# Patient Record
Sex: MALE | Race: WHITE | NOT HISPANIC OR LATINO | URBAN - METROPOLITAN AREA
[De-identification: names, ages, dates, MRNs, and addresses within clinical notes are randomized per-mention and may not be internally consistent; named-entity substitution may affect disease eponyms.]

---

## 2018-01-01 ENCOUNTER — INPATIENT (INPATIENT)
Facility: HOSPITAL | Age: 68
LOS: 2 days | End: 2018-10-07
Attending: INTERNAL MEDICINE | Admitting: INTERNAL MEDICINE
Payer: MEDICARE

## 2018-01-01 VITALS
DIASTOLIC BLOOD PRESSURE: 60 MMHG | HEART RATE: 93 BPM | TEMPERATURE: 104 F | SYSTOLIC BLOOD PRESSURE: 103 MMHG | OXYGEN SATURATION: 100 % | RESPIRATION RATE: 28 BRPM

## 2018-01-01 VITALS — SYSTOLIC BLOOD PRESSURE: 114 MMHG | DIASTOLIC BLOOD PRESSURE: 28 MMHG

## 2018-01-01 LAB
ALBUMIN SERPL ELPH-MCNC: 1.6 G/DL — LOW (ref 3.3–5)
ALBUMIN SERPL ELPH-MCNC: 3 G/DL — LOW (ref 3.3–5)
ALP SERPL-CCNC: 139 U/L — HIGH (ref 40–120)
ALP SERPL-CCNC: 235 U/L — HIGH (ref 40–120)
ALT FLD-CCNC: 1285 U/L — HIGH (ref 12–78)
ALT FLD-CCNC: 16 U/L — SIGNIFICANT CHANGE UP (ref 12–78)
ANION GAP SERPL CALC-SCNC: 10 MMOL/L — SIGNIFICANT CHANGE UP (ref 5–17)
ANION GAP SERPL CALC-SCNC: 10 MMOL/L — SIGNIFICANT CHANGE UP (ref 5–17)
ANION GAP SERPL CALC-SCNC: 20 MMOL/L — HIGH (ref 5–17)
ANION GAP SERPL CALC-SCNC: 26 MMOL/L — HIGH (ref 5–17)
APPEARANCE UR: CLEAR — SIGNIFICANT CHANGE UP
APTT BLD: 30.6 SEC — SIGNIFICANT CHANGE UP (ref 27.5–37.4)
APTT BLD: 61.6 SEC — HIGH (ref 27.5–37.4)
AST SERPL-CCNC: 32 U/L — SIGNIFICANT CHANGE UP (ref 15–37)
AST SERPL-CCNC: SIGNIFICANT CHANGE UP U/L (ref 15–37)
BASE EXCESS BLDA CALC-SCNC: -0.1 MMOL/L — SIGNIFICANT CHANGE UP (ref -2–2)
BASE EXCESS BLDA CALC-SCNC: -20.8 MMOL/L — LOW (ref -2–2)
BASE EXCESS BLDV CALC-SCNC: -21.9 MMOL/L — LOW (ref -2–2)
BASOPHILS # BLD AUTO: 0.03 K/UL — SIGNIFICANT CHANGE UP (ref 0–0.2)
BASOPHILS NFR BLD AUTO: 0.3 % — SIGNIFICANT CHANGE UP (ref 0–2)
BILIRUB DIRECT SERPL-MCNC: 2.57 MG/DL — HIGH (ref 0.05–0.2)
BILIRUB INDIRECT FLD-MCNC: 0.3 MG/DL — SIGNIFICANT CHANGE UP (ref 0.2–1)
BILIRUB SERPL-MCNC: 0.8 MG/DL — SIGNIFICANT CHANGE UP (ref 0.2–1.2)
BILIRUB SERPL-MCNC: 2.9 MG/DL — HIGH (ref 0.2–1.2)
BILIRUB UR-MCNC: NEGATIVE — SIGNIFICANT CHANGE UP
BLOOD GAS COMMENTS, VENOUS: SIGNIFICANT CHANGE UP
BLOOD GAS COMMENTS: SIGNIFICANT CHANGE UP
BLOOD GAS SOURCE: SIGNIFICANT CHANGE UP
BLOOD GAS SOURCE: SIGNIFICANT CHANGE UP
BUN SERPL-MCNC: 29 MG/DL — HIGH (ref 7–23)
BUN SERPL-MCNC: 35 MG/DL — HIGH (ref 7–23)
BUN SERPL-MCNC: 36 MG/DL — HIGH (ref 7–23)
BUN SERPL-MCNC: 36 MG/DL — HIGH (ref 7–23)
C DIFF BY PCR RESULT: SIGNIFICANT CHANGE UP
C DIFF TOX GENS STL QL NAA+PROBE: SIGNIFICANT CHANGE UP
CALCIUM SERPL-MCNC: 6.5 MG/DL — CRITICAL LOW (ref 8.5–10.1)
CALCIUM SERPL-MCNC: 7.6 MG/DL — LOW (ref 8.5–10.1)
CALCIUM SERPL-MCNC: 8 MG/DL — LOW (ref 8.5–10.1)
CALCIUM SERPL-MCNC: 8.1 MG/DL — LOW (ref 8.5–10.1)
CHLORIDE SERPL-SCNC: 100 MMOL/L — SIGNIFICANT CHANGE UP (ref 96–108)
CHLORIDE SERPL-SCNC: 102 MMOL/L — SIGNIFICANT CHANGE UP (ref 96–108)
CHLORIDE SERPL-SCNC: 104 MMOL/L — SIGNIFICANT CHANGE UP (ref 96–108)
CHLORIDE SERPL-SCNC: 105 MMOL/L — SIGNIFICANT CHANGE UP (ref 96–108)
CK MB BLD-MCNC: 1.1 % — SIGNIFICANT CHANGE UP (ref 0–3.5)
CK MB BLD-MCNC: 1.3 % — SIGNIFICANT CHANGE UP (ref 0–3.5)
CK MB BLD-MCNC: 2.2 % — SIGNIFICANT CHANGE UP (ref 0–3.5)
CK MB BLD-MCNC: 2.3 % — SIGNIFICANT CHANGE UP (ref 0–3.5)
CK MB BLD-MCNC: <1.1 % — SIGNIFICANT CHANGE UP (ref 0–3.5)
CK MB CFR SERPL CALC: 1 NG/ML — SIGNIFICANT CHANGE UP (ref 0.5–3.6)
CK MB CFR SERPL CALC: 1.7 NG/ML — SIGNIFICANT CHANGE UP (ref 0.5–3.6)
CK MB CFR SERPL CALC: 1.9 NG/ML — SIGNIFICANT CHANGE UP (ref 0.5–3.6)
CK MB CFR SERPL CALC: 4.2 NG/ML — HIGH (ref 0.5–3.6)
CK MB CFR SERPL CALC: <1 NG/ML — SIGNIFICANT CHANGE UP (ref 0.5–3.6)
CK SERPL-CCNC: 314 U/L — HIGH (ref 26–308)
CK SERPL-CCNC: 74 U/L — SIGNIFICANT CHANGE UP (ref 26–308)
CK SERPL-CCNC: 87 U/L — SIGNIFICANT CHANGE UP (ref 26–308)
CK SERPL-CCNC: 94 U/L — SIGNIFICANT CHANGE UP (ref 26–308)
CK SERPL-CCNC: 95 U/L — SIGNIFICANT CHANGE UP (ref 26–308)
CO2 SERPL-SCNC: 12 MMOL/L — LOW (ref 22–31)
CO2 SERPL-SCNC: 13 MMOL/L — LOW (ref 22–31)
CO2 SERPL-SCNC: 24 MMOL/L — SIGNIFICANT CHANGE UP (ref 22–31)
CO2 SERPL-SCNC: 26 MMOL/L — SIGNIFICANT CHANGE UP (ref 22–31)
COLOR SPEC: YELLOW — SIGNIFICANT CHANGE UP
CREAT SERPL-MCNC: 1.24 MG/DL — SIGNIFICANT CHANGE UP (ref 0.5–1.3)
CREAT SERPL-MCNC: 1.68 MG/DL — HIGH (ref 0.5–1.3)
CREAT SERPL-MCNC: 2.13 MG/DL — HIGH (ref 0.5–1.3)
CREAT SERPL-MCNC: 2.35 MG/DL — HIGH (ref 0.5–1.3)
CULTURE RESULTS: NO GROWTH — SIGNIFICANT CHANGE UP
CULTURE RESULTS: SIGNIFICANT CHANGE UP
DIFF PNL FLD: NEGATIVE — SIGNIFICANT CHANGE UP
DIGOXIN SERPL-MCNC: 0.72 NG/ML — LOW (ref 0.8–2)
EOSINOPHIL # BLD AUTO: 0.16 K/UL — SIGNIFICANT CHANGE UP (ref 0–0.5)
EOSINOPHIL NFR BLD AUTO: 1.7 % — SIGNIFICANT CHANGE UP (ref 0–6)
FLUAV SPEC QL CULT: NEGATIVE — SIGNIFICANT CHANGE UP
FLUBV AG SPEC QL IA: NEGATIVE — SIGNIFICANT CHANGE UP
GAS PNL BLDV: SIGNIFICANT CHANGE UP
GLUCOSE SERPL-MCNC: 106 MG/DL — HIGH (ref 70–99)
GLUCOSE SERPL-MCNC: 128 MG/DL — HIGH (ref 70–99)
GLUCOSE SERPL-MCNC: 36 MG/DL — CRITICAL LOW (ref 70–99)
GLUCOSE SERPL-MCNC: 51 MG/DL — LOW (ref 70–99)
GLUCOSE UR QL: NEGATIVE MG/DL — SIGNIFICANT CHANGE UP
GRAM STN FLD: SIGNIFICANT CHANGE UP
GRAM STN FLD: SIGNIFICANT CHANGE UP
HCO3 BLDA-SCNC: 10 MMOL/L — LOW (ref 21–29)
HCO3 BLDA-SCNC: 22 MMOL/L — SIGNIFICANT CHANGE UP (ref 21–29)
HCO3 BLDV-SCNC: 13 MMOL/L — LOW (ref 21–29)
HCT VFR BLD CALC: 37.7 % — LOW (ref 39–50)
HCT VFR BLD CALC: 40.9 % — SIGNIFICANT CHANGE UP (ref 39–50)
HCT VFR BLD CALC: 42.5 % — SIGNIFICANT CHANGE UP (ref 39–50)
HCT VFR BLD CALC: 44.3 % — SIGNIFICANT CHANGE UP (ref 39–50)
HGB BLD-MCNC: 11.8 G/DL — LOW (ref 13–17)
HGB BLD-MCNC: 12 G/DL — LOW (ref 13–17)
HGB BLD-MCNC: 12.7 G/DL — LOW (ref 13–17)
HGB BLD-MCNC: 12.8 G/DL — LOW (ref 13–17)
HOROWITZ INDEX BLDA+IHG-RTO: 100 — SIGNIFICANT CHANGE UP
HOROWITZ INDEX BLDA+IHG-RTO: 100 — SIGNIFICANT CHANGE UP
HOROWITZ INDEX BLDV+IHG-RTO: 100 — SIGNIFICANT CHANGE UP
IMM GRANULOCYTES NFR BLD AUTO: 0.5 % — SIGNIFICANT CHANGE UP (ref 0–1.5)
INR BLD: 1.3 RATIO — HIGH (ref 0.88–1.16)
INR BLD: 3.61 RATIO — HIGH (ref 0.88–1.16)
KETONES UR-MCNC: NEGATIVE — SIGNIFICANT CHANGE UP
LACTATE SERPL-SCNC: 1.6 MMOL/L — SIGNIFICANT CHANGE UP (ref 0.7–2)
LACTATE SERPL-SCNC: 17 MMOL/L — CRITICAL HIGH (ref 0.7–2)
LEGIONELLA AG UR QL: NEGATIVE — SIGNIFICANT CHANGE UP
LEUKOCYTE ESTERASE UR-ACNC: NEGATIVE — SIGNIFICANT CHANGE UP
LYMPHOCYTES # BLD AUTO: 1.02 K/UL — SIGNIFICANT CHANGE UP (ref 1–3.3)
LYMPHOCYTES # BLD AUTO: 10.8 % — LOW (ref 13–44)
MAGNESIUM SERPL-MCNC: 2 MG/DL — SIGNIFICANT CHANGE UP (ref 1.6–2.6)
MAGNESIUM SERPL-MCNC: 2.6 MG/DL — SIGNIFICANT CHANGE UP (ref 1.6–2.6)
MAGNESIUM SERPL-MCNC: 2.6 MG/DL — SIGNIFICANT CHANGE UP (ref 1.6–2.6)
MCHC RBC-ENTMCNC: 24.8 PG — LOW (ref 27–34)
MCHC RBC-ENTMCNC: 24.9 PG — LOW (ref 27–34)
MCHC RBC-ENTMCNC: 25 PG — LOW (ref 27–34)
MCHC RBC-ENTMCNC: 25.6 PG — LOW (ref 27–34)
MCHC RBC-ENTMCNC: 28.2 GM/DL — LOW (ref 32–36)
MCHC RBC-ENTMCNC: 28.9 GM/DL — LOW (ref 32–36)
MCHC RBC-ENTMCNC: 31.1 GM/DL — LOW (ref 32–36)
MCHC RBC-ENTMCNC: 31.3 GM/DL — LOW (ref 32–36)
MCV RBC AUTO: 79.7 FL — LOW (ref 80–100)
MCV RBC AUTO: 79.9 FL — LOW (ref 80–100)
MCV RBC AUTO: 86 FL — SIGNIFICANT CHANGE UP (ref 80–100)
MCV RBC AUTO: 90.6 FL — SIGNIFICANT CHANGE UP (ref 80–100)
MONOCYTES # BLD AUTO: 0.59 K/UL — SIGNIFICANT CHANGE UP (ref 0–0.9)
MONOCYTES NFR BLD AUTO: 6.3 % — SIGNIFICANT CHANGE UP (ref 2–14)
NEUTROPHILS # BLD AUTO: 7.58 K/UL — HIGH (ref 1.8–7.4)
NEUTROPHILS NFR BLD AUTO: 80.4 % — HIGH (ref 43–77)
NITRITE UR-MCNC: NEGATIVE — SIGNIFICANT CHANGE UP
NRBC # BLD: 0 /100 WBCS — SIGNIFICANT CHANGE UP (ref 0–0)
NT-PROBNP SERPL-SCNC: HIGH PG/ML (ref 0–125)
PCO2 BLDA: 27 MMHG — LOW (ref 32–46)
PCO2 BLDA: 41 MMHG — SIGNIFICANT CHANGE UP (ref 32–46)
PCO2 BLDV: 72 MMHG — HIGH (ref 35–50)
PH BLD: 7.01 — CRITICAL LOW (ref 7.35–7.45)
PH BLD: 7.51 — HIGH (ref 7.35–7.45)
PH BLDV: 6.87 — CRITICAL LOW (ref 7.35–7.45)
PH UR: 5 — SIGNIFICANT CHANGE UP (ref 5–8)
PHOSPHATE SERPL-MCNC: 2.5 MG/DL — SIGNIFICANT CHANGE UP (ref 2.5–4.5)
PHOSPHATE SERPL-MCNC: 7.1 MG/DL — HIGH (ref 2.5–4.5)
PHOSPHATE SERPL-MCNC: >9 MG/DL — SIGNIFICANT CHANGE UP (ref 2.5–4.5)
PLATELET # BLD AUTO: 116 K/UL — LOW (ref 150–400)
PLATELET # BLD AUTO: 200 K/UL — SIGNIFICANT CHANGE UP (ref 150–400)
PLATELET # BLD AUTO: 237 K/UL — SIGNIFICANT CHANGE UP (ref 150–400)
PLATELET # BLD AUTO: 280 K/UL — SIGNIFICANT CHANGE UP (ref 150–400)
PO2 BLDA: 121 MMHG — HIGH (ref 74–108)
PO2 BLDA: 291 MMHG — HIGH (ref 74–108)
PO2 BLDV: <44 MMHG — SIGNIFICANT CHANGE UP (ref 25–45)
POTASSIUM SERPL-MCNC: 3.3 MMOL/L — LOW (ref 3.5–5.3)
POTASSIUM SERPL-MCNC: 4.6 MMOL/L — SIGNIFICANT CHANGE UP (ref 3.5–5.3)
POTASSIUM SERPL-MCNC: 4.7 MMOL/L — SIGNIFICANT CHANGE UP (ref 3.5–5.3)
POTASSIUM SERPL-MCNC: 5.1 MMOL/L — SIGNIFICANT CHANGE UP (ref 3.5–5.3)
POTASSIUM SERPL-SCNC: 3.3 MMOL/L — LOW (ref 3.5–5.3)
POTASSIUM SERPL-SCNC: 4.6 MMOL/L — SIGNIFICANT CHANGE UP (ref 3.5–5.3)
POTASSIUM SERPL-SCNC: 4.7 MMOL/L — SIGNIFICANT CHANGE UP (ref 3.5–5.3)
POTASSIUM SERPL-SCNC: 5.1 MMOL/L — SIGNIFICANT CHANGE UP (ref 3.5–5.3)
PROT SERPL-MCNC: 5.9 GM/DL — LOW (ref 6–8.3)
PROT SERPL-MCNC: 8.7 GM/DL — HIGH (ref 6–8.3)
PROT UR-MCNC: NEGATIVE MG/DL — SIGNIFICANT CHANGE UP
PROTHROM AB SERPL-ACNC: 14.2 SEC — HIGH (ref 9.8–12.7)
PROTHROM AB SERPL-ACNC: 40.4 SEC — HIGH (ref 9.8–12.7)
RBC # BLD: 4.69 M/UL — SIGNIFICANT CHANGE UP (ref 4.2–5.8)
RBC # BLD: 4.72 M/UL — SIGNIFICANT CHANGE UP (ref 4.2–5.8)
RBC # BLD: 5.13 M/UL — SIGNIFICANT CHANGE UP (ref 4.2–5.8)
RBC # BLD: 5.15 M/UL — SIGNIFICANT CHANGE UP (ref 4.2–5.8)
RBC # FLD: 20.9 % — HIGH (ref 10.3–14.5)
RBC # FLD: 21.3 % — HIGH (ref 10.3–14.5)
RBC # FLD: 21.4 % — HIGH (ref 10.3–14.5)
RBC # FLD: 21.6 % — HIGH (ref 10.3–14.5)
SAO2 % BLDA: 100 % — HIGH (ref 92–96)
SAO2 % BLDA: 96 % — SIGNIFICANT CHANGE UP (ref 92–96)
SAO2 % BLDV: 32 % — LOW (ref 67–88)
SODIUM SERPL-SCNC: 135 MMOL/L — SIGNIFICANT CHANGE UP (ref 135–145)
SODIUM SERPL-SCNC: 136 MMOL/L — SIGNIFICANT CHANGE UP (ref 135–145)
SODIUM SERPL-SCNC: 138 MMOL/L — SIGNIFICANT CHANGE UP (ref 135–145)
SODIUM SERPL-SCNC: 143 MMOL/L — SIGNIFICANT CHANGE UP (ref 135–145)
SP GR SPEC: 1 — LOW (ref 1.01–1.02)
SPECIMEN SOURCE: SIGNIFICANT CHANGE UP
TROPONIN I SERPL-MCNC: 0.34 NG/ML — HIGH (ref 0.01–0.04)
TROPONIN I SERPL-MCNC: 0.51 NG/ML — HIGH (ref 0.01–0.04)
TROPONIN I SERPL-MCNC: 0.86 NG/ML — HIGH (ref 0.01–0.04)
TROPONIN I SERPL-MCNC: <.015 NG/ML — SIGNIFICANT CHANGE UP (ref 0.01–0.04)
UROBILINOGEN FLD QL: NEGATIVE MG/DL — SIGNIFICANT CHANGE UP
WBC # BLD: 10.22 K/UL — SIGNIFICANT CHANGE UP (ref 3.8–10.5)
WBC # BLD: 15.41 K/UL — HIGH (ref 3.8–10.5)
WBC # BLD: 15.47 K/UL — HIGH (ref 3.8–10.5)
WBC # BLD: 9.43 K/UL — SIGNIFICANT CHANGE UP (ref 3.8–10.5)
WBC # FLD AUTO: 10.22 K/UL — SIGNIFICANT CHANGE UP (ref 3.8–10.5)
WBC # FLD AUTO: 15.41 K/UL — HIGH (ref 3.8–10.5)
WBC # FLD AUTO: 15.47 K/UL — HIGH (ref 3.8–10.5)
WBC # FLD AUTO: 9.43 K/UL — SIGNIFICANT CHANGE UP (ref 3.8–10.5)

## 2018-01-01 PROCEDURE — 99291 CRITICAL CARE FIRST HOUR: CPT | Mod: 25

## 2018-01-01 PROCEDURE — 99291 CRITICAL CARE FIRST HOUR: CPT

## 2018-01-01 PROCEDURE — 71045 X-RAY EXAM CHEST 1 VIEW: CPT | Mod: 26

## 2018-01-01 PROCEDURE — 93970 EXTREMITY STUDY: CPT | Mod: 26

## 2018-01-01 PROCEDURE — 93306 TTE W/DOPPLER COMPLETE: CPT | Mod: 26

## 2018-01-01 PROCEDURE — 74176 CT ABD & PELVIS W/O CONTRAST: CPT | Mod: 26

## 2018-01-01 PROCEDURE — 31500 INSERT EMERGENCY AIRWAY: CPT

## 2018-01-01 PROCEDURE — 93010 ELECTROCARDIOGRAM REPORT: CPT

## 2018-01-01 RX ORDER — DEXMEDETOMIDINE HYDROCHLORIDE IN 0.9% SODIUM CHLORIDE 4 UG/ML
0.3 INJECTION INTRAVENOUS
Qty: 200 | Refills: 0 | Status: DISCONTINUED | OUTPATIENT
Start: 2018-01-01 | End: 2018-01-01

## 2018-01-01 RX ORDER — BENZOCAINE AND MENTHOL 5; 1 G/100ML; G/100ML
1 LIQUID ORAL
Qty: 0 | Refills: 0 | Status: DISCONTINUED | OUTPATIENT
Start: 2018-01-01 | End: 2018-01-01

## 2018-01-01 RX ORDER — CEFTRIAXONE 500 MG/1
1 INJECTION, POWDER, FOR SOLUTION INTRAMUSCULAR; INTRAVENOUS EVERY 24 HOURS
Qty: 0 | Refills: 0 | Status: DISCONTINUED | OUTPATIENT
Start: 2018-01-01 | End: 2018-01-01

## 2018-01-01 RX ORDER — PHENYLEPHRINE HYDROCHLORIDE 10 MG/ML
0.1 INJECTION INTRAVENOUS
Qty: 160 | Refills: 0 | Status: DISCONTINUED | OUTPATIENT
Start: 2018-01-01 | End: 2018-01-01

## 2018-01-01 RX ORDER — SODIUM BICARBONATE 1 MEQ/ML
100 SYRINGE (ML) INTRAVENOUS ONCE
Qty: 0 | Refills: 0 | Status: COMPLETED | OUTPATIENT
Start: 2018-01-01 | End: 2018-01-01

## 2018-01-01 RX ORDER — DEXTROSE 50 % IN WATER 50 %
100 SYRINGE (ML) INTRAVENOUS ONCE
Qty: 0 | Refills: 0 | Status: COMPLETED | OUTPATIENT
Start: 2018-01-01 | End: 2018-01-01

## 2018-01-01 RX ORDER — POTASSIUM CHLORIDE 20 MEQ
40 PACKET (EA) ORAL ONCE
Qty: 0 | Refills: 0 | Status: COMPLETED | OUTPATIENT
Start: 2018-01-01 | End: 2018-01-01

## 2018-01-01 RX ORDER — SODIUM CHLORIDE 9 MG/ML
1000 INJECTION, SOLUTION INTRAVENOUS ONCE
Qty: 0 | Refills: 0 | Status: COMPLETED | OUTPATIENT
Start: 2018-01-01 | End: 2018-01-01

## 2018-01-01 RX ORDER — EPINEPHRINE 0.3 MG/.3ML
0.1 INJECTION INTRAMUSCULAR; SUBCUTANEOUS ONCE
Qty: 0 | Refills: 0 | Status: COMPLETED | OUTPATIENT
Start: 2018-01-01 | End: 2018-01-01

## 2018-01-01 RX ORDER — DOBUTAMINE HCL 250MG/20ML
5 VIAL (ML) INTRAVENOUS
Qty: 500 | Refills: 0 | Status: DISCONTINUED | OUTPATIENT
Start: 2018-01-01 | End: 2018-01-01

## 2018-01-01 RX ORDER — EPINEPHRINE 0.3 MG/.3ML
0.1 INJECTION INTRAMUSCULAR; SUBCUTANEOUS
Qty: 4 | Refills: 0 | Status: DISCONTINUED | OUTPATIENT
Start: 2018-01-01 | End: 2018-01-01

## 2018-01-01 RX ORDER — DIGOXIN 250 MCG
0.12 TABLET ORAL DAILY
Qty: 0 | Refills: 0 | Status: DISCONTINUED | OUTPATIENT
Start: 2018-01-01 | End: 2018-01-01

## 2018-01-01 RX ORDER — MEXILETINE HYDROCHLORIDE 150 MG/1
150 CAPSULE ORAL EVERY 8 HOURS
Qty: 0 | Refills: 0 | Status: DISCONTINUED | OUTPATIENT
Start: 2018-01-01 | End: 2018-01-01

## 2018-01-01 RX ORDER — AMIODARONE HYDROCHLORIDE 400 MG/1
0.5 TABLET ORAL
Qty: 900 | Refills: 0 | Status: DISCONTINUED | OUTPATIENT
Start: 2018-01-01 | End: 2018-01-01

## 2018-01-01 RX ORDER — EPINEPHRINE 0.3 MG/.3ML
1 INJECTION INTRAMUSCULAR; SUBCUTANEOUS ONCE
Qty: 0 | Refills: 0 | Status: DISCONTINUED | OUTPATIENT
Start: 2018-01-01 | End: 2018-01-01

## 2018-01-01 RX ORDER — NOREPINEPHRINE BITARTRATE/D5W 8 MG/250ML
0.05 PLASTIC BAG, INJECTION (ML) INTRAVENOUS
Qty: 8 | Refills: 0 | Status: DISCONTINUED | OUTPATIENT
Start: 2018-01-01 | End: 2018-01-01

## 2018-01-01 RX ORDER — CHLORHEXIDINE GLUCONATE 213 G/1000ML
15 SOLUTION TOPICAL
Qty: 0 | Refills: 0 | Status: DISCONTINUED | OUTPATIENT
Start: 2018-01-01 | End: 2018-01-01

## 2018-01-01 RX ORDER — DEXTROSE 50 % IN WATER 50 %
50 SYRINGE (ML) INTRAVENOUS ONCE
Qty: 0 | Refills: 0 | Status: COMPLETED | OUTPATIENT
Start: 2018-01-01 | End: 2018-01-01

## 2018-01-01 RX ORDER — FENTANYL CITRATE 50 UG/ML
0.5 INJECTION INTRAVENOUS
Qty: 2500 | Refills: 0 | Status: DISCONTINUED | OUTPATIENT
Start: 2018-01-01 | End: 2018-01-01

## 2018-01-01 RX ORDER — FUROSEMIDE 40 MG
40 TABLET ORAL ONCE
Qty: 0 | Refills: 0 | Status: COMPLETED | OUTPATIENT
Start: 2018-01-01 | End: 2018-01-01

## 2018-01-01 RX ORDER — AMIODARONE HYDROCHLORIDE 400 MG/1
1 TABLET ORAL
Qty: 900 | Refills: 0 | Status: DISCONTINUED | OUTPATIENT
Start: 2018-01-01 | End: 2018-01-01

## 2018-01-01 RX ORDER — ASPIRIN/CALCIUM CARB/MAGNESIUM 324 MG
325 TABLET ORAL DAILY
Qty: 0 | Refills: 0 | Status: DISCONTINUED | OUTPATIENT
Start: 2018-01-01 | End: 2018-01-01

## 2018-01-01 RX ORDER — PIPERACILLIN AND TAZOBACTAM 4; .5 G/20ML; G/20ML
3.38 INJECTION, POWDER, LYOPHILIZED, FOR SOLUTION INTRAVENOUS ONCE
Qty: 0 | Refills: 0 | Status: COMPLETED | OUTPATIENT
Start: 2018-01-01 | End: 2018-01-01

## 2018-01-01 RX ORDER — ACETAMINOPHEN 500 MG
1000 TABLET ORAL ONCE
Qty: 0 | Refills: 0 | Status: COMPLETED | OUTPATIENT
Start: 2018-01-01 | End: 2018-01-01

## 2018-01-01 RX ORDER — SODIUM CHLORIDE 9 MG/ML
1000 INJECTION, SOLUTION INTRAVENOUS ONCE
Qty: 0 | Refills: 0 | Status: DISCONTINUED | OUTPATIENT
Start: 2018-01-01 | End: 2018-01-01

## 2018-01-01 RX ORDER — PIPERACILLIN AND TAZOBACTAM 4; .5 G/20ML; G/20ML
3.38 INJECTION, POWDER, LYOPHILIZED, FOR SOLUTION INTRAVENOUS EVERY 12 HOURS
Qty: 0 | Refills: 0 | Status: DISCONTINUED | OUTPATIENT
Start: 2018-01-01 | End: 2018-01-01

## 2018-01-01 RX ORDER — AMIODARONE HYDROCHLORIDE 400 MG/1
400 TABLET ORAL DAILY
Qty: 0 | Refills: 0 | Status: DISCONTINUED | OUTPATIENT
Start: 2018-01-01 | End: 2018-01-01

## 2018-01-01 RX ORDER — ACETAMINOPHEN 500 MG
650 TABLET ORAL EVERY 6 HOURS
Qty: 0 | Refills: 0 | Status: DISCONTINUED | OUTPATIENT
Start: 2018-01-01 | End: 2018-01-01

## 2018-01-01 RX ORDER — AZITHROMYCIN 500 MG/1
500 TABLET, FILM COATED ORAL EVERY 24 HOURS
Qty: 0 | Refills: 0 | Status: DISCONTINUED | OUTPATIENT
Start: 2018-01-01 | End: 2018-01-01

## 2018-01-01 RX ORDER — SODIUM CHLORIDE 9 MG/ML
1000 INJECTION, SOLUTION INTRAVENOUS
Qty: 0 | Refills: 0 | Status: DISCONTINUED | OUTPATIENT
Start: 2018-01-01 | End: 2018-01-01

## 2018-01-01 RX ORDER — TAMSULOSIN HYDROCHLORIDE 0.4 MG/1
0.4 CAPSULE ORAL AT BEDTIME
Qty: 0 | Refills: 0 | Status: DISCONTINUED | OUTPATIENT
Start: 2018-01-01 | End: 2018-01-01

## 2018-01-01 RX ORDER — PIPERACILLIN AND TAZOBACTAM 4; .5 G/20ML; G/20ML
3.38 INJECTION, POWDER, LYOPHILIZED, FOR SOLUTION INTRAVENOUS EVERY 8 HOURS
Qty: 0 | Refills: 0 | Status: DISCONTINUED | OUTPATIENT
Start: 2018-01-01 | End: 2018-01-01

## 2018-01-01 RX ORDER — VASOPRESSIN 20 [USP'U]/ML
0.04 INJECTION INTRAVENOUS
Qty: 100 | Refills: 0 | Status: DISCONTINUED | OUTPATIENT
Start: 2018-01-01 | End: 2018-01-01

## 2018-01-01 RX ORDER — AMIODARONE HYDROCHLORIDE 400 MG/1
150 TABLET ORAL ONCE
Qty: 0 | Refills: 0 | Status: COMPLETED | OUTPATIENT
Start: 2018-01-01 | End: 2018-01-01

## 2018-01-01 RX ORDER — CHLORHEXIDINE GLUCONATE 213 G/1000ML
1 SOLUTION TOPICAL
Qty: 0 | Refills: 0 | Status: DISCONTINUED | OUTPATIENT
Start: 2018-01-01 | End: 2018-01-01

## 2018-01-01 RX ORDER — HALOPERIDOL DECANOATE 100 MG/ML
2 INJECTION INTRAMUSCULAR ONCE
Qty: 0 | Refills: 0 | Status: COMPLETED | OUTPATIENT
Start: 2018-01-01 | End: 2018-01-01

## 2018-01-01 RX ORDER — EPINEPHRINE 0.3 MG/.3ML
1 INJECTION INTRAMUSCULAR; SUBCUTANEOUS ONCE
Qty: 0 | Refills: 0 | Status: COMPLETED | OUTPATIENT
Start: 2018-01-01 | End: 2018-01-01

## 2018-01-01 RX ORDER — NOREPINEPHRINE BITARTRATE/D5W 8 MG/250ML
0.05 PLASTIC BAG, INJECTION (ML) INTRAVENOUS
Qty: 16 | Refills: 0 | Status: DISCONTINUED | OUTPATIENT
Start: 2018-01-01 | End: 2018-01-01

## 2018-01-01 RX ORDER — SODIUM BICARBONATE 1 MEQ/ML
50 SYRINGE (ML) INTRAVENOUS ONCE
Qty: 0 | Refills: 0 | Status: COMPLETED | OUTPATIENT
Start: 2018-01-01 | End: 2018-01-01

## 2018-01-01 RX ORDER — LIDOCAINE HCL 20 MG/ML
10 VIAL (ML) INJECTION ONCE
Qty: 0 | Refills: 0 | Status: COMPLETED | OUTPATIENT
Start: 2018-01-01 | End: 2018-01-01

## 2018-01-01 RX ORDER — FUROSEMIDE 40 MG
20 TABLET ORAL ONCE
Qty: 0 | Refills: 0 | Status: COMPLETED | OUTPATIENT
Start: 2018-01-01 | End: 2018-01-01

## 2018-01-01 RX ORDER — SODIUM CHLORIDE 9 MG/ML
2300 INJECTION INTRAMUSCULAR; INTRAVENOUS; SUBCUTANEOUS ONCE
Qty: 0 | Refills: 0 | Status: COMPLETED | OUTPATIENT
Start: 2018-01-01 | End: 2018-01-01

## 2018-01-01 RX ORDER — HYDROCORTISONE 20 MG
80 TABLET ORAL EVERY 8 HOURS
Qty: 0 | Refills: 0 | Status: DISCONTINUED | OUTPATIENT
Start: 2018-01-01 | End: 2018-01-01

## 2018-01-01 RX ORDER — ALPRAZOLAM 0.25 MG
0.25 TABLET ORAL ONCE
Qty: 0 | Refills: 0 | Status: DISCONTINUED | OUTPATIENT
Start: 2018-01-01 | End: 2018-01-01

## 2018-01-01 RX ORDER — MORPHINE SULFATE 50 MG/1
2 CAPSULE, EXTENDED RELEASE ORAL ONCE
Qty: 0 | Refills: 0 | Status: DISCONTINUED | OUTPATIENT
Start: 2018-01-01 | End: 2018-01-01

## 2018-01-01 RX ORDER — FUROSEMIDE 40 MG
40 TABLET ORAL ONCE
Qty: 0 | Refills: 0 | Status: DISCONTINUED | OUTPATIENT
Start: 2018-01-01 | End: 2018-01-01

## 2018-01-01 RX ORDER — LIDOCAINE HCL 20 MG/ML
100 VIAL (ML) INJECTION ONCE
Qty: 0 | Refills: 0 | Status: COMPLETED | OUTPATIENT
Start: 2018-01-01 | End: 2018-01-01

## 2018-01-01 RX ORDER — FUROSEMIDE 40 MG
40 TABLET ORAL EVERY 12 HOURS
Qty: 0 | Refills: 0 | Status: DISCONTINUED | OUTPATIENT
Start: 2018-01-01 | End: 2018-01-01

## 2018-01-01 RX ORDER — PANTOPRAZOLE SODIUM 20 MG/1
40 TABLET, DELAYED RELEASE ORAL DAILY
Qty: 0 | Refills: 0 | Status: DISCONTINUED | OUTPATIENT
Start: 2018-01-01 | End: 2018-01-01

## 2018-01-01 RX ORDER — ACETAMINOPHEN 500 MG
975 TABLET ORAL ONCE
Qty: 0 | Refills: 0 | Status: COMPLETED | OUTPATIENT
Start: 2018-01-01 | End: 2018-01-01

## 2018-01-01 RX ORDER — SODIUM CHLORIDE 9 MG/ML
1000 INJECTION INTRAMUSCULAR; INTRAVENOUS; SUBCUTANEOUS ONCE
Qty: 0 | Refills: 0 | Status: COMPLETED | OUTPATIENT
Start: 2018-01-01 | End: 2018-01-01

## 2018-01-01 RX ORDER — HEPARIN SODIUM 5000 [USP'U]/ML
5000 INJECTION INTRAVENOUS; SUBCUTANEOUS EVERY 8 HOURS
Qty: 0 | Refills: 0 | Status: DISCONTINUED | OUTPATIENT
Start: 2018-01-01 | End: 2018-01-01

## 2018-01-01 RX ORDER — VANCOMYCIN HCL 1 G
1000 VIAL (EA) INTRAVENOUS ONCE
Qty: 0 | Refills: 0 | Status: COMPLETED | OUTPATIENT
Start: 2018-01-01 | End: 2018-01-01

## 2018-01-01 RX ORDER — MIDODRINE HYDROCHLORIDE 2.5 MG/1
10 TABLET ORAL THREE TIMES A DAY
Qty: 0 | Refills: 0 | Status: DISCONTINUED | OUTPATIENT
Start: 2018-01-01 | End: 2018-01-01

## 2018-01-01 RX ORDER — SIMVASTATIN 20 MG/1
20 TABLET, FILM COATED ORAL AT BEDTIME
Qty: 0 | Refills: 0 | Status: DISCONTINUED | OUTPATIENT
Start: 2018-01-01 | End: 2018-01-01

## 2018-01-01 RX ORDER — PROPOFOL 10 MG/ML
30 INJECTION, EMULSION INTRAVENOUS
Qty: 1000 | Refills: 0 | Status: DISCONTINUED | OUTPATIENT
Start: 2018-01-01 | End: 2018-01-01

## 2018-01-01 RX ADMIN — EPINEPHRINE 28.12 MICROGRAM(S)/KG/MIN: 0.3 INJECTION INTRAMUSCULAR; SUBCUTANEOUS at 08:46

## 2018-01-01 RX ADMIN — HEPARIN SODIUM 5000 UNIT(S): 5000 INJECTION INTRAVENOUS; SUBCUTANEOUS at 06:45

## 2018-01-01 RX ADMIN — AMIODARONE HYDROCHLORIDE 400 MILLIGRAM(S): 400 TABLET ORAL at 06:45

## 2018-01-01 RX ADMIN — Medication 1 MILLIGRAM(S): at 14:50

## 2018-01-01 RX ADMIN — CHLORHEXIDINE GLUCONATE 15 MILLILITER(S): 213 SOLUTION TOPICAL at 17:22

## 2018-01-01 RX ADMIN — Medication 7.03 MICROGRAM(S)/KG/MIN: at 18:20

## 2018-01-01 RX ADMIN — HEPARIN SODIUM 5000 UNIT(S): 5000 INJECTION INTRAVENOUS; SUBCUTANEOUS at 16:13

## 2018-01-01 RX ADMIN — HEPARIN SODIUM 5000 UNIT(S): 5000 INJECTION INTRAVENOUS; SUBCUTANEOUS at 14:27

## 2018-01-01 RX ADMIN — Medication 10 MILLILITER(S): at 14:48

## 2018-01-01 RX ADMIN — Medication 1000 MILLIGRAM(S): at 16:23

## 2018-01-01 RX ADMIN — AZITHROMYCIN 255 MILLIGRAM(S): 500 TABLET, FILM COATED ORAL at 06:46

## 2018-01-01 RX ADMIN — Medication 100 MILLIEQUIVALENT(S): at 08:30

## 2018-01-01 RX ADMIN — Medication 7.03 MICROGRAM(S)/KG/MIN: at 12:02

## 2018-01-01 RX ADMIN — Medication 975 MILLIGRAM(S): at 06:47

## 2018-01-01 RX ADMIN — Medication 650 MILLIGRAM(S): at 21:17

## 2018-01-01 RX ADMIN — Medication 3.52 MICROGRAM(S)/KG/MIN: at 17:45

## 2018-01-01 RX ADMIN — SIMVASTATIN 20 MILLIGRAM(S): 20 TABLET, FILM COATED ORAL at 21:41

## 2018-01-01 RX ADMIN — AMIODARONE HYDROCHLORIDE 16.67 MG/MIN: 400 TABLET ORAL at 21:42

## 2018-01-01 RX ADMIN — MIDODRINE HYDROCHLORIDE 10 MILLIGRAM(S): 2.5 TABLET ORAL at 05:08

## 2018-01-01 RX ADMIN — AZITHROMYCIN 255 MILLIGRAM(S): 500 TABLET, FILM COATED ORAL at 07:06

## 2018-01-01 RX ADMIN — MORPHINE SULFATE 2 MILLIGRAM(S): 50 CAPSULE, EXTENDED RELEASE ORAL at 12:30

## 2018-01-01 RX ADMIN — Medication 3.52 MICROGRAM(S)/KG/MIN: at 01:30

## 2018-01-01 RX ADMIN — VASOPRESSIN 2.4 UNIT(S)/MIN: 20 INJECTION INTRAVENOUS at 17:46

## 2018-01-01 RX ADMIN — CHLORHEXIDINE GLUCONATE 15 MILLILITER(S): 213 SOLUTION TOPICAL at 18:41

## 2018-01-01 RX ADMIN — Medication 100 MILLIEQUIVALENT(S): at 22:25

## 2018-01-01 RX ADMIN — Medication 100 MILLIGRAM(S): at 15:10

## 2018-01-01 RX ADMIN — EPINEPHRINE 28.12 MICROGRAM(S)/KG/MIN: 0.3 INJECTION INTRAMUSCULAR; SUBCUTANEOUS at 01:29

## 2018-01-01 RX ADMIN — HALOPERIDOL DECANOATE 2 MILLIGRAM(S): 100 INJECTION INTRAMUSCULAR at 14:39

## 2018-01-01 RX ADMIN — PHENYLEPHRINE HYDROCHLORIDE 1.41 MICROGRAM(S)/KG/MIN: 10 INJECTION INTRAVENOUS at 17:45

## 2018-01-01 RX ADMIN — AZITHROMYCIN 255 MILLIGRAM(S): 500 TABLET, FILM COATED ORAL at 05:29

## 2018-01-01 RX ADMIN — AMIODARONE HYDROCHLORIDE 150 MILLIGRAM(S): 400 TABLET ORAL at 15:00

## 2018-01-01 RX ADMIN — Medication 100 MILLILITER(S): at 10:07

## 2018-01-01 RX ADMIN — CHLORHEXIDINE GLUCONATE 1 APPLICATION(S): 213 SOLUTION TOPICAL at 06:07

## 2018-01-01 RX ADMIN — SODIUM CHLORIDE 2300 MILLILITER(S): 9 INJECTION INTRAMUSCULAR; INTRAVENOUS; SUBCUTANEOUS at 05:32

## 2018-01-01 RX ADMIN — Medication 325 MILLIGRAM(S): at 11:58

## 2018-01-01 RX ADMIN — HEPARIN SODIUM 5000 UNIT(S): 5000 INJECTION INTRAVENOUS; SUBCUTANEOUS at 07:05

## 2018-01-01 RX ADMIN — CHLORHEXIDINE GLUCONATE 15 MILLILITER(S): 213 SOLUTION TOPICAL at 07:06

## 2018-01-01 RX ADMIN — Medication 1000 MILLIGRAM(S): at 05:32

## 2018-01-01 RX ADMIN — Medication 7.03 MICROGRAM(S)/KG/MIN: at 07:50

## 2018-01-01 RX ADMIN — Medication 40 MILLIGRAM(S): at 06:46

## 2018-01-01 RX ADMIN — MIDODRINE HYDROCHLORIDE 10 MILLIGRAM(S): 2.5 TABLET ORAL at 16:12

## 2018-01-01 RX ADMIN — Medication 100 MILLIEQUIVALENT(S): at 06:10

## 2018-01-01 RX ADMIN — CHLORHEXIDINE GLUCONATE 15 MILLILITER(S): 213 SOLUTION TOPICAL at 06:07

## 2018-01-01 RX ADMIN — PIPERACILLIN AND TAZOBACTAM 200 GRAM(S): 4; .5 INJECTION, POWDER, LYOPHILIZED, FOR SOLUTION INTRAVENOUS at 04:33

## 2018-01-01 RX ADMIN — PIPERACILLIN AND TAZOBACTAM 25 GRAM(S): 4; .5 INJECTION, POWDER, LYOPHILIZED, FOR SOLUTION INTRAVENOUS at 06:46

## 2018-01-01 RX ADMIN — EPINEPHRINE 0.1 MILLIGRAM(S): 0.3 INJECTION INTRAMUSCULAR; SUBCUTANEOUS at 05:10

## 2018-01-01 RX ADMIN — SODIUM CHLORIDE 2000 MILLILITER(S): 9 INJECTION, SOLUTION INTRAVENOUS at 06:17

## 2018-01-01 RX ADMIN — Medication 250 MILLIGRAM(S): at 04:32

## 2018-01-01 RX ADMIN — Medication 40 MILLIGRAM(S): at 05:19

## 2018-01-01 RX ADMIN — SIMVASTATIN 20 MILLIGRAM(S): 20 TABLET, FILM COATED ORAL at 21:17

## 2018-01-01 RX ADMIN — Medication 40 MILLIEQUIVALENT(S): at 06:24

## 2018-01-01 RX ADMIN — Medication 650 MILLIGRAM(S): at 14:03

## 2018-01-01 RX ADMIN — VASOPRESSIN 2.4 UNIT(S)/MIN: 20 INJECTION INTRAVENOUS at 07:34

## 2018-01-01 RX ADMIN — CEFTRIAXONE 100 GRAM(S): 500 INJECTION, POWDER, FOR SOLUTION INTRAMUSCULAR; INTRAVENOUS at 12:02

## 2018-01-01 RX ADMIN — Medication 10 MILLIGRAM(S): at 14:45

## 2018-01-01 RX ADMIN — EPINEPHRINE 1 MILLIGRAM(S): 0.3 INJECTION INTRAMUSCULAR; SUBCUTANEOUS at 22:25

## 2018-01-01 RX ADMIN — Medication 100 MILLIEQUIVALENT(S): at 20:01

## 2018-01-01 RX ADMIN — MIDODRINE HYDROCHLORIDE 10 MILLIGRAM(S): 2.5 TABLET ORAL at 14:27

## 2018-01-01 RX ADMIN — PANTOPRAZOLE SODIUM 40 MILLIGRAM(S): 20 TABLET, DELAYED RELEASE ORAL at 11:58

## 2018-01-01 RX ADMIN — PIPERACILLIN AND TAZOBACTAM 25 GRAM(S): 4; .5 INJECTION, POWDER, LYOPHILIZED, FOR SOLUTION INTRAVENOUS at 11:58

## 2018-01-01 RX ADMIN — MIDODRINE HYDROCHLORIDE 10 MILLIGRAM(S): 2.5 TABLET ORAL at 21:18

## 2018-01-01 RX ADMIN — Medication 80 MILLIGRAM(S): at 16:12

## 2018-01-01 RX ADMIN — HEPARIN SODIUM 5000 UNIT(S): 5000 INJECTION INTRAVENOUS; SUBCUTANEOUS at 21:17

## 2018-01-01 RX ADMIN — Medication 400 MILLIGRAM(S): at 16:08

## 2018-01-01 RX ADMIN — Medication 325 MILLIGRAM(S): at 12:02

## 2018-01-01 RX ADMIN — MORPHINE SULFATE 2 MILLIGRAM(S): 50 CAPSULE, EXTENDED RELEASE ORAL at 12:21

## 2018-01-01 RX ADMIN — Medication 2 MILLIGRAM(S): at 04:33

## 2018-01-01 RX ADMIN — EPINEPHRINE 1 MILLIGRAM(S): 0.3 INJECTION INTRAMUSCULAR; SUBCUTANEOUS at 07:07

## 2018-01-01 RX ADMIN — Medication 80 MILLIGRAM(S): at 21:40

## 2018-01-01 RX ADMIN — PANTOPRAZOLE SODIUM 40 MILLIGRAM(S): 20 TABLET, DELAYED RELEASE ORAL at 17:21

## 2018-01-01 RX ADMIN — Medication 7.03 MICROGRAM(S)/KG/MIN: at 07:34

## 2018-01-01 RX ADMIN — Medication 650 MILLIGRAM(S): at 14:30

## 2018-01-01 RX ADMIN — HEPARIN SODIUM 5000 UNIT(S): 5000 INJECTION INTRAVENOUS; SUBCUTANEOUS at 06:07

## 2018-01-01 RX ADMIN — HEPARIN SODIUM 5000 UNIT(S): 5000 INJECTION INTRAVENOUS; SUBCUTANEOUS at 05:08

## 2018-01-01 RX ADMIN — SODIUM CHLORIDE 2000 MILLILITER(S): 9 INJECTION INTRAMUSCULAR; INTRAVENOUS; SUBCUTANEOUS at 04:43

## 2018-01-01 RX ADMIN — MIDODRINE HYDROCHLORIDE 10 MILLIGRAM(S): 2.5 TABLET ORAL at 21:41

## 2018-01-01 RX ADMIN — EPINEPHRINE 28.12 MICROGRAM(S)/KG/MIN: 0.3 INJECTION INTRAMUSCULAR; SUBCUTANEOUS at 17:47

## 2018-01-01 RX ADMIN — Medication 50 MILLIEQUIVALENT(S): at 05:10

## 2018-01-01 RX ADMIN — SIMVASTATIN 20 MILLIGRAM(S): 20 TABLET, FILM COATED ORAL at 21:09

## 2018-01-01 RX ADMIN — TAMSULOSIN HYDROCHLORIDE 0.4 MILLIGRAM(S): 0.4 CAPSULE ORAL at 21:09

## 2018-01-01 RX ADMIN — HEPARIN SODIUM 5000 UNIT(S): 5000 INJECTION INTRAVENOUS; SUBCUTANEOUS at 21:41

## 2018-01-01 RX ADMIN — AMIODARONE HYDROCHLORIDE 33.33 MG/MIN: 400 TABLET ORAL at 15:15

## 2018-01-01 RX ADMIN — HEPARIN SODIUM 5000 UNIT(S): 5000 INJECTION INTRAVENOUS; SUBCUTANEOUS at 13:38

## 2018-01-01 RX ADMIN — Medication 40 MILLIGRAM(S): at 18:21

## 2018-01-01 RX ADMIN — EPINEPHRINE 1 MILLIGRAM(S): 0.3 INJECTION INTRAMUSCULAR; SUBCUTANEOUS at 06:15

## 2018-01-01 RX ADMIN — Medication 975 MILLIGRAM(S): at 05:30

## 2018-01-01 RX ADMIN — MEXILETINE HYDROCHLORIDE 150 MILLIGRAM(S): 150 CAPSULE ORAL at 14:27

## 2018-01-01 RX ADMIN — Medication 325 MILLIGRAM(S): at 11:22

## 2018-01-01 RX ADMIN — Medication 0.12 MILLIGRAM(S): at 06:45

## 2018-01-01 RX ADMIN — Medication 50 MILLILITER(S): at 08:34

## 2018-01-01 RX ADMIN — Medication 650 MILLIGRAM(S): at 06:21

## 2018-01-01 RX ADMIN — EPINEPHRINE 28.13 MICROGRAM(S)/KG/MIN: 0.3 INJECTION INTRAMUSCULAR; SUBCUTANEOUS at 11:45

## 2018-01-01 RX ADMIN — SODIUM CHLORIDE 2300 MILLILITER(S): 9 INJECTION INTRAMUSCULAR; INTRAVENOUS; SUBCUTANEOUS at 04:33

## 2018-01-01 RX ADMIN — MIDODRINE HYDROCHLORIDE 10 MILLIGRAM(S): 2.5 TABLET ORAL at 06:07

## 2018-01-01 RX ADMIN — PROPOFOL 13.5 MICROGRAM(S)/KG/MIN: 10 INJECTION, EMULSION INTRAVENOUS at 16:40

## 2018-01-01 RX ADMIN — Medication 0.12 MILLIGRAM(S): at 05:08

## 2018-01-01 RX ADMIN — Medication 40 MILLIGRAM(S): at 02:39

## 2018-01-01 RX ADMIN — AMIODARONE HYDROCHLORIDE 400 MILLIGRAM(S): 400 TABLET ORAL at 05:08

## 2018-01-01 RX ADMIN — PHENYLEPHRINE HYDROCHLORIDE 1.41 MICROGRAM(S)/KG/MIN: 10 INJECTION INTRAVENOUS at 01:30

## 2018-01-01 RX ADMIN — PIPERACILLIN AND TAZOBACTAM 3.38 GRAM(S): 4; .5 INJECTION, POWDER, LYOPHILIZED, FOR SOLUTION INTRAVENOUS at 05:00

## 2018-01-01 RX ADMIN — MIDODRINE HYDROCHLORIDE 10 MILLIGRAM(S): 2.5 TABLET ORAL at 20:31

## 2018-01-01 RX ADMIN — Medication 80 MILLIGRAM(S): at 06:07

## 2018-01-01 RX ADMIN — Medication 0.25 MILLIGRAM(S): at 05:07

## 2018-01-01 RX ADMIN — Medication 11.25 MICROGRAM(S)/KG/MIN: at 17:45

## 2018-01-01 RX ADMIN — HEPARIN SODIUM 5000 UNIT(S): 5000 INJECTION INTRAVENOUS; SUBCUTANEOUS at 21:09

## 2018-01-01 RX ADMIN — Medication 3.52 MICROGRAM(S)/KG/MIN: at 08:46

## 2018-01-01 RX ADMIN — CEFTRIAXONE 100 GRAM(S): 500 INJECTION, POWDER, FOR SOLUTION INTRAMUSCULAR; INTRAVENOUS at 11:22

## 2018-01-01 RX ADMIN — Medication 650 MILLIGRAM(S): at 21:45

## 2018-01-01 RX ADMIN — SODIUM CHLORIDE 150 MILLILITER(S): 9 INJECTION, SOLUTION INTRAVENOUS at 17:45

## 2018-01-01 RX ADMIN — Medication 50 MILLILITER(S): at 06:01

## 2018-01-01 RX ADMIN — PIPERACILLIN AND TAZOBACTAM 25 GRAM(S): 4; .5 INJECTION, POWDER, LYOPHILIZED, FOR SOLUTION INTRAVENOUS at 00:36

## 2018-10-04 NOTE — H&P ADULT - ATTENDING COMMENTS
I have seen and examined the patient. I agree with the above history, physical exam, and plan of care except for as detailed below.    69 y/o M w/hx of HFrEF s/p AICD placement admitted for acute hypoxemic respiratory failure. CXR showing bilateral infiltrates consistent with acute pulmonary edema. BNP > 15968. Now leukocytosis, however patient does have documented fever. Patient also w/DAVID vs CKD. Prior Cr in August 1.40. US of lower extremities negative for DVT.    - Continue BiPAP, wean as tolerated  - Diuresis goal net negative ~ 1L  - Empiric antibiotics for fever  - Avoid nephrotoxins, trend Cr I have seen and examined the patient. I agree with the above history, physical exam, and plan of care except for as detailed below.    67 y/o M w/hx of HFrEF s/p AICD placement admitted for acute hypoxemic respiratory failure. CXR showing bilateral infiltrates consistent with acute pulmonary edema. BNP > 23960. Now leukocytosis, however patient does have documented fever. Patient also w/DAVID vs CKD. Prior Cr in August 1.40. US of lower extremities negative for DVT.    - Continue BiPAP, wean as tolerated  - Diuresis goal net negative ~ 1L  - Empiric antibiotics for fever  - Avoid nephrotoxins, trend Cr    Attending critical care time 35 minutes I have seen and examined the patient. I agree with the above history, physical exam, and plan of care except for as detailed below.    69 y/o M w/hx of HFrEF s/p AICD placement admitted for acute hypoxemic respiratory failure likely secondary to pulmonary edema in the setting of heart failure exacerbation. CXR showing bilateral infiltrates consistent with acute pulmonary edema. BNP > 06338. Now leukocytosis, however patient does have documented fever. Patient also w/DAVID vs CKD. Prior Cr in August 1.40. US of lower extremities negative for DVT.    - Continue BiPAP, wean as tolerated  - Diuresis goal net negative ~ 1L  - Empiric antibiotics for fever  - Avoid nephrotoxins, trend Cr    Attending critical care time 35 minutes I have seen and examined the patient. I agree with the above history, physical exam, and plan of care except for as detailed below.    67 y/o M w/hx of HFrEF s/p AICD placement admitted for acute hypoxemic respiratory failure likely secondary to pulmonary edema in the setting of heart failure exacerbation. CXR showing bilateral infiltrates consistent with acute pulmonary edema. BNP > 58429. Now leukocytosis, however patient does have documented fever. Patient also w/DAVID vs CKD. Prior Cr in August 1.40. US of lower extremities negative for DVT.    - Continue BiPAP, wean as tolerated  - Diuresis goal net negative ~ 1L  - Empiric antibiotics for fever  - Avoid nephrotoxins, trend Cr  - Outpatient follow up for L inguinal hernia    Attending critical care time 35 minutes

## 2018-10-04 NOTE — ED ADULT NURSE NOTE - OBJECTIVE STATEMENT
67 y/o M PMH chf, copd, cabg BIBA in respiratory distress, on cpap, placed on bipap b/l leg swelling, tachypneic, former smoker. c/o difficulty breathing x3 days

## 2018-10-04 NOTE — ED PROVIDER NOTE - MEDICAL DECISION MAKING DETAILS
Patient brought for respiratory distress. Sepsis alert was called in ER.  Labs, cultures, imaging ordered and reviewed.  IVF and antibiotics were initiated.  Based on studies, source seems to be  respiratory.  Pending CT abdomen to r/o further source.  No meningeal symptoms.  Patient is to be admitted to the hospital and the case was discussed with the admitting physician.  Any changes in plan, additional imaging/labs, and further work up will be at the discretion of the admitting physician. Patient brought for respiratory distress. Sepsis alert was called in ER.  Labs, cultures, imaging ordered and reviewed.  IVF and antibiotics were initiated.  Based on studies, source seems to be  respiratory.  Pending CT abdomen to r/o further source, admitting team to follow up.  No meningeal symptoms.  Patient is to be admitted to the hospital and the case was discussed with the admitting physician.  Any changes in plan, additional imaging/labs, and further work up will be at the discretion of the admitting physician.

## 2018-10-04 NOTE — H&P ADULT - NSHPPHYSICALEXAM_GEN_ALL_CORE
PHYSICAL EXAM:  GENERAL:in distress on bipap machine  CHEST/LUNG: b/l rales  HEART: irregularly irregular. No murmurs, rubs, or gallops.   ABDOMEN: Soft, Nontender, Nondistended. Bowel sounds present  hernia to right femoral   EXTREMITIES:  b/l leg edema, warm to touch, engorged veins  MS: awake, knows self

## 2018-10-04 NOTE — ED ADULT NURSE NOTE - NSIMPLEMENTINTERV_GEN_ALL_ED
Implemented All Fall with Harm Risk Interventions:  Pillow to call system. Call bell, personal items and telephone within reach. Instruct patient to call for assistance. Room bathroom lighting operational. Non-slip footwear when patient is off stretcher. Physically safe environment: no spills, clutter or unnecessary equipment. Stretcher in lowest position, wheels locked, appropriate side rails in place. Provide visual cue, wrist band, yellow gown, etc. Monitor gait and stability. Monitor for mental status changes and reorient to person, place, and time. Review medications for side effects contributing to fall risk. Reinforce activity limits and safety measures with patient and family. Provide visual clues: red socks.

## 2018-10-04 NOTE — ED PROVIDER NOTE - CARE PLAN
Principal Discharge DX:	Sepsis, due to unspecified organism  Secondary Diagnosis:	CHF (congestive heart failure)

## 2018-10-04 NOTE — ED PROVIDER NOTE - PHYSICAL EXAMINATION
Gen: Alert, moderate distress, ill appearing  Head: NC, AT, PERRL, EOMI, normal lids/conjunctiva  ENT: normal hearing, patent oropharynx without erythema/exudate, uvula midline  Neck: +supple, no tenderness/meningismus, +JVD, +Trachea midline  Pulm: Bilateral BS, increased resp effort, +BL rales throughout  CV: RRR, no M/R/G, +dist pulses  Abd: soft, +large left inguinal hernia which is reducible, +BS  Mskel: +BL pitting edema, no erythema/cyanosis  Skin: no rash, warm/dry  Neuro:no apparent sensory/motor deficits, coordination intact Gen: Alert, moderate distress, ill appearing  Head: NC, AT, PERRL, EOMI, normal lids/conjunctiva  ENT: normal hearing, patent oropharynx without erythema/exudate, uvula midline  Neck: +supple, no tenderness/meningismus, +JVD, +Trachea midline  Pulm: Bilateral BS, increased resp effort, +BL rales throughout  CV: RRR, no M/R/G, +dist pulses  Abd: soft, NT, +large left inguinal hernia which is reducible, +BS  Mskel: +BL pitting edema, no erythema/cyanosis  Skin: no rash, warm/dry  Neuro:no apparent sensory/motor deficits, coordination intact

## 2018-10-04 NOTE — ED PROVIDER NOTE - CRITICAL CARE PROVIDED
direct patient care (not related to procedure)/interpretation of diagnostic studies/consultation with other physicians/documentation

## 2018-10-04 NOTE — H&P ADULT - ASSESSMENT
Patient is a 68 year old male with pmhx of , CHF w/ AICD, Emphysema, former smoker(quit 10yrs ago), BPH , admits to critical care for respiratory distress on bipap machine with possible pneumonia fever of 103, vs CHF exacerbation  -admit to critical care  -resp: on bipap machine 16/5/50%, wean as tolerates  repeat abg, if no improvement possible intubation  sent rapid flu A/B, ?sputum culture, urine legionella  - cardio: in CHF, received 40 mg of lasix, resume home dose lasix 40mg bid  and strict I/O  ECHO, EKG, trend troponins  -ID: pancultured, received zosyn, vanco, and add azithro while waiting for sensitivity  -GI npo for now  -DVT prophylaxis: heparin SC

## 2018-10-04 NOTE — H&P ADULT - PMH
AICD (automatic cardioverter/defibrillator) present    CHF (congestive heart failure)    Coronary artery disease    Femoral-femoral bypass graft thrombosis, right    HTN (hypertension)    MI (myocardial infarction)

## 2018-10-04 NOTE — ED PROVIDER NOTE - OBJECTIVE STATEMENT
Pertinent PMH/PSH/FHx/SHx and Review of Systems contained within:  Patient presents to the ED for shortness of breath.  Patient brought by EMS for difficulty breathing was saturating 72% in the field, started on CPAP.  On arrival has difficulty speaking.  Denies any chest pain.  Patient denies EtOH/tobacco/illicit substance use.

## 2018-10-04 NOTE — H&P ADULT - HISTORY OF PRESENT ILLNESS
Patient is a 68 year old male with pmhx of , CHF w/ AICD, Emphysema, former smoker(quit 10yrs ago), BPH ,  c/o difficulty breathing x3 days Patient is a 68 year old male with pmhx of , CHF w/ AICD, Emphysema, former smoker(quit 10yrs ago), BPH ,  c/o difficulty breathing x3 days, found by EMS in distress with low saturation without oxygen, and placed on bipap machine and transferred to ED.  While in ED, pt is being worked up for sepsis, received 2L of normal saline boluses, and also received 40mg of lasix of CHF exacerbation.  Pt very uncomfortable looking, unable to provide history, while on bipap machine.    Patient transferred to critical care for further care.

## 2018-10-04 NOTE — H&P ADULT - NSHPLABSRESULTS_GEN_ALL_CORE
.  LABS:                         12.7   9.43  )-----------( 237      ( 04 Oct 2018 02:18 )             40.9     10-04    136  |  100  |  36<H>  ----------------------------<  106<H>  4.7   |  26  |  1.68<H>    Ca    8.0<L>      04 Oct 2018 02:18    TPro  8.7<H>  /  Alb  3.0<L>  /  TBili  0.8  /  DBili  x   /  AST  32  /  ALT  16  /  AlkPhos  139<H>  10-04    PT/INR - ( 04 Oct 2018 02:18 )   PT: 14.2 sec;   INR: 1.30 ratio         PTT - ( 04 Oct 2018 02:18 )  PTT:30.6 sec  Urinalysis Basic - ( 04 Oct 2018 05:21 )    Color: Yellow / Appearance: Clear / S.005 / pH: x  Gluc: x / Ketone: Negative  / Bili: Negative / Urobili: Negative mg/dL   Blood: x / Protein: Negative mg/dL / Nitrite: Negative   Leuk Esterase: Negative / RBC: x / WBC x   Sq Epi: x / Non Sq Epi: x / Bacteria: x      Serum Pro-Brain Natriuretic Peptide: 52451 pg/mL (10-04 @ 02:18)    Lactate, Blood: 1.6 mmol/L (10-04 @ 02:18)      RADIOLOGY, EKG & ADDITIONAL TESTS: Reviewed.

## 2018-10-05 NOTE — CHART NOTE - NSCHARTNOTEFT_GEN_A_CORE
Patient noted to be in ventricular tachycardia, patient initially mainted normal BP despite VTach to 150s/160s. Patient was given amio 150 mg bolus and lidocaine bolus. Patient briefly went ot HR of ~ 200 with drop in BP, however VTach soon broke and BP returned to baseline. After the event patient became altered and was intubated for airway protection and acute hypoxemic respiratory failure. Amio gtt was initiated.

## 2018-10-05 NOTE — PROGRESS NOTE ADULT - SUBJECTIVE AND OBJECTIVE BOX
HPI:  Patient is a 68 year old male with pmhx of , CHF w/ AICD, Emphysema, former smoker(quit 10yrs ago), BPH ,  c/o difficulty breathing x3 days, found by EMS in distress with low saturation without oxygen, and placed on bipap machine and transferred to ED.  While in ED, pt is being worked up for sepsis, received 2L of normal saline boluses, and also received 40mg of lasix of CHF exacerbation.  Pt very uncomfortable looking, unable to provide history, while on bipap machine.    Patient transferred to critical care for further care. (04 Oct 2018 05:29)      24 hr events: Went into VTach receieved amio and lidocaine, became altered, intubated for respiratory failure. Unable to obtain history.    ## ROS:  [x ] unable to obtain    ## Vitals  ICU Vital Signs Last 24 Hrs  T(C): 37.5 (05 Oct 2018 16:07), Max: 39.6 (05 Oct 2018 06:35)  T(F): 99.5 (05 Oct 2018 16:07), Max: 103.2 (05 Oct 2018 06:35)  HR: 117 (05 Oct 2018 16:30) (60 - 144)  BP: 94/66 (05 Oct 2018 16:30) (73/47 - 128/65)  BP(mean): 72 (05 Oct 2018 16:30) (45 - 101)  ABP: --  ABP(mean): --  RR: 29 (05 Oct 2018 16:30) (17 - 52)  SpO2: 89% (05 Oct 2018 16:30) (80% - 100%)      ## Physical Exam:  Gen: Elderly male lying in bed, sedated, intubated, NAD  HEENT: NC/AT sclerae anicteric  Resp: Mechanical breath sounds b/l  CV: Tachycardic, regular rhythm  Abd: Soft + BS, large L inguinal hernia, reducible  Neuro: Sedated, unarousable    ## Vent Data  Mode: AC/ CMV (Assist Control/ Continuous Mandatory Ventilation)  RR (machine): 12  TV (machine): 460  FiO2: 50  PEEP: 5  ITime: 1  MAP: 16  PIP: 28      ## Labs:  Chem:  10-05    138  |  104  |  29<H>  ----------------------------<  51<L>  3.3<L>   |  24  |  1.24    Ca    7.6<L>      05 Oct 2018 04:15  Phos  2.5     10-05  Mg     2.0     10-05    TPro  8.7<H>  /  Alb  3.0<L>  /  TBili  0.8  /  DBili  x   /  AST  32  /  ALT  16  /  AlkPhos  139<H>  10-04    LIVER FUNCTIONS - ( 04 Oct 2018 02:18 )  Alb: 3.0 g/dL / Pro: 8.7 gm/dL / ALK PHOS: 139 U/L / ALT: 16 U/L / AST: 32 U/L / GGT: x           CBC:                        11.8   10.22 )-----------( 200      ( 05 Oct 2018 04:15 )             37.7     Coags:  PT/INR - ( 04 Oct 2018 02:18 )   PT: 14.2 sec;   INR: 1.30 ratio         PTT - ( 04 Oct 2018 02:18 )  PTT:30.6 sec    Urinalysis Basic - ( 04 Oct 2018 05:21 )    Color: Yellow / Appearance: Clear / S.005 / pH: x  Gluc: x / Ketone: Negative  / Bili: Negative / Urobili: Negative mg/dL   Blood: x / Protein: Negative mg/dL / Nitrite: Negative   Leuk Esterase: Negative / RBC: x / WBC x   Sq Epi: x / Non Sq Epi: x / Bacteria: x        ## Cardiac  CARDIAC MARKERS ( 04 Oct 2018 22:22 )  <.015 ng/mL / x     / x     / x     / x      CARDIAC MARKERS ( 04 Oct 2018 11:55 )  <.015 ng/mL / x     / 95 U/L / x     / <1.0 ng/mL  CARDIAC MARKERS ( 04 Oct 2018 02:18 )  <.015 ng/mL / x     / 94 U/L / x     / 1.0 ng/mL        ## Blood Gas  ABG - ( 05 Oct 2018 16:52 )  pH, Arterial: x     pH, Blood: 7.36  /  pCO2: 36    /  pO2: 64    / HCO3: 20    / Base Excess: -4.7  /  SaO2: 90                  #I/Os  I&O's Detail    04 Oct 2018 07:01  -  05 Oct 2018 07:00  --------------------------------------------------------  IN:    IV PiggyBack: 300 mL    Oral Fluid: 360 mL  Total IN: 660 mL    OUT:    Indwelling Catheter - Urethral: 1960 mL  Total OUT: 1960 mL    Total NET: -1300 mL      05 Oct 2018 07:01  -  05 Oct 2018 17:03  --------------------------------------------------------  IN:    norepinephrine Infusion: 140.5 mL    Oral Fluid: 230 mL  Total IN: 370.5 mL    OUT:    Indwelling Catheter - Urethral: 140 mL  Total OUT: 140 mL    Total NET: 230.5 mL          ## Imaging:    ## Medications:  MEDICATIONS  (STANDING):  amiodarone Infusion 1 mG/Min (33.333 mL/Hr) IV Continuous <Continuous>  amiodarone Infusion 0.5 mG/Min (16.667 mL/Hr) IV Continuous <Continuous>  aspirin 325 milliGRAM(s) Oral daily  azithromycin  IVPB 500 milliGRAM(s) IV Intermittent every 24 hours  cefTRIAXone   IVPB 1 Gram(s) IV Intermittent every 24 hours  chlorhexidine 0.12% Liquid 15 milliLiter(s) Oral Mucosa two times a day  digoxin     Tablet 0.125 milliGRAM(s) Oral daily  furosemide   Injectable 40 milliGRAM(s) IV Push every 12 hours  heparin  Injectable 5000 Unit(s) SubCutaneous every 8 hours  midodrine 10 milliGRAM(s) Oral three times a day  norepinephrine Infusion 0.05 MICROgram(s)/kG/Min (7.031 mL/Hr) IV Continuous <Continuous>  pantoprazole  Injectable 40 milliGRAM(s) IV Push daily  propofol Infusion 30 MICROgram(s)/kG/Min (13.5 mL/Hr) IV Continuous <Continuous>  simvastatin 20 milliGRAM(s) Oral at bedtime    MEDICATIONS  (PRN):  acetaminophen   Tablet .. 650 milliGRAM(s) Oral every 6 hours PRN Mild Pain (1 - 3)

## 2018-10-06 NOTE — DIETITIAN INITIAL EVALUATION ADULT. - PERTINENT LABORATORY DATA
10-06 Na143 mmol/L Glu 128 mg/dL<H> K+ 4.6 mmol/L Cr  2.35 mg/dL<H> BUN 36 mg/dL<H> 10-06 Phos >9.0 mg/dL 10-06 Alb 1.6 g/dL<L>10-06 ALT 1285 U/L<H> AST >79804 U/L Alkaline Phosphatase 235 U/L<H> 10-06 Hgb 12.0 g/dL<L> Hct 42.5 %

## 2018-10-06 NOTE — PROVIDER CONTACT NOTE (MEDICATION) - BACKGROUND
67 y/o M w/hx of HFrEF s/p AICD placement admitted for acute hypoxemic respiratory failure likely secondary to pulmonary edema in the setting of heart failure exacerbation, in cardiogenic shock

## 2018-10-06 NOTE — CHART NOTE - NSCHARTNOTEFT_GEN_A_CORE
HPI:  Patient is a 68 year old male with pmhx of , CHF w/ AICD, Emphysema, former smoker(quit 10yrs ago), BPH ,  c/o difficulty breathing x3 days, found by EMS in distress with low saturation without oxygen, and placed on bipap machine and transferred to ED.  While in ED, pt is being worked up for sepsis, received 2L of normal saline boluses, and also received 40mg of lasix of CHF exacerbation.  Pt very uncomfortable looking, unable to provide history, while on bipap machine.    Patient transferred to critical care for further care. (04 Oct 2018 05:29) Patient now in cardiogenic shock with shock liver on three pressors including dobutamine.  Patient lower extremities cyanotic,     Patient with pacemaker, ICD. Point of Care Ultrasound performed; cardiac wall motion noted. Patient unresponsive  Vital Signs Last 24 Hrs  T(C): 36.8 (06 Oct 2018 19:30), Max: 37.4 (06 Oct 2018 00:00)  T(F): 98.2 (06 Oct 2018 19:30), Max: 99.4 (06 Oct 2018 00:00)  HR: 64 (06 Oct 2018 21:00) (53 - 102)  BP: 52/31 (06 Oct 2018 21:00) (33/11 - 126/93)  BP(mean): 34 (06 Oct 2018 21:00) (4 - 100)  RR: 20 (06 Oct 2018 21:00) (16 - 29)  SpO2: 96% (06 Oct 2018 21:00) (88% - 100%)      10-05-18 @ 07:01  -  10-06-18 @ 07:00  --------------------------------------------------------  IN: 6032.6 mL / OUT: 555 mL / NET: 5477.6 mL    10-06-18 @ 07:01  -  10-06-18 @ 22:02  --------------------------------------------------------  IN: 9526.3 mL / OUT: 80 mL / NET: 9446.3 mL    Lab Results:  CBC  CBC Full  -  ( 06 Oct 2018 11:47 )  WBC Count : 15.41 K/uL  Hemoglobin : 12.0 g/dL  Hematocrit : 42.5 %  Platelet Count - Automated : 116 K/uL  Mean Cell Volume : 90.6 fl  Mean Cell Hemoglobin : 25.6 pg  Mean Cell Hemoglobin Concentration : 28.2 gm/dL  Auto Neutrophil # : x  Auto Lymphocyte # : x  Auto Monocyte # : x  Auto Eosinophil # : x  Auto Basophil # : x  Auto Neutrophil % : x  Auto Lymphocyte % : x  Auto Monocyte % : x  Auto Eosinophil % : x  Auto Basophil % : x    .		Differential:	[] Automated		[] Manual  Chemistry                        12.0   15.41 )-----------( 116      ( 06 Oct 2018 11:47 )             42.5     10-06    143  |  105  |  36<H>  ----------------------------<  128<H>  4.6   |  12<L>  |  2.35<H>    Ca    6.5<LL>      06 Oct 2018 11:47  Phos  >9.0     10-06  Mg     2.6     10-06    TPro  5.9<L>  /  Alb  1.6<L>  /  TBili  2.9<H>  /  DBili  2.57<H>  /  AST  >93232  /  ALT  1285<H>  /  AlkPhos  235<H>  10-06    LIVER FUNCTIONS - ( 06 Oct 2018 11:47 )  Alb: 1.6 g/dL / Pro: 5.9 gm/dL / ALK PHOS: 235 U/L / ALT: 1285 U/L / AST: >90756 U/L / GGT: x           PT/INR - ( 06 Oct 2018 11:47 )   PT: 40.4 sec;   INR: 3.61 ratio         PTT - ( 06 Oct 2018 11:47 )  PTT:61.6 sec      ABG - ( 06 Oct 2018 09:44 )  pH, Arterial: x     pH, Blood: 7.01  /  pCO2: 41    /  pO2: 121   / HCO3: 10    / Base Excess: -20.8 /  SaO2: 96          MICROBIOLOGY/CULTURES:  Culture Results:   No growth to date. (10-04 @ 10:19)  Culture Results:   No growth to date. (10-04 @ 10:19)  Culture Results:   No growth (10-04 @ 09:37)    A/P Septic/Cardiogenic Shock- poor prognosis                                               Brother informed

## 2018-10-06 NOTE — PROGRESS NOTE ADULT - SUBJECTIVE AND OBJECTIVE BOX
CC: Patient is a 68y old  Male who presents with a chief complaint of resp failure (05 Oct 2018 17:03)      ## HPI:HPI:  Patient is a 68 year old male with pmhx of , CHF w/ AICD, Emphysema, former smoker(quit 10yrs ago), BPH ,  c/o difficulty breathing x3 days, found by EMS in distress with low saturation without oxygen, and placed on bipap machine and transferred to ED.  While in ED, pt is being worked up for sepsis, received 2L of normal saline boluses, and also received 40mg of lasix of CHF exacerbation.  Pt very uncomfortable looking, unable to provide history, while on bipap machine.    Patient transferred to critical care for further care. (04 Oct 2018 05:29)      O/N: central line placed emergently for increasing pressor requirements despite 3 pressors, patient severly hypotensive with severe shock liver and AST in [address]71's    ## ROS:  unable to assess ros due to mental status   ## EXAM  ICU Vital Signs Last 24 Hrs  T(C): 36.4 (06 Oct 2018 15:30), Max: 38 (05 Oct 2018 19:27)  T(F): 97.5 (06 Oct 2018 15:30), Max: 100.4 (05 Oct 2018 19:27)  HR: 63 (06 Oct 2018 15:00) (59 - 125)  BP: 46/31 (06 Oct 2018 15:00) (36/16 - 126/93)  BP(mean): 35 (06 Oct 2018 15:00) (4 - 100)  ABP: --  ABP(mean): --  RR: 20 (06 Oct 2018 15:00) (16 - 39)  SpO2: 100% (06 Oct 2018 15:00) (88% - 100%)    CON : NAD  EENT : EOMI, MMM  NECK : Full ROM  RESP : CTAB no increased WOB  CARD : rrr no m/r/g  ABD : S NT ND NABS no rebound  EXT : cool extremities  NEURO : AAOX0  ## Labs:  Lab Results:  CBC  CBC Full  -  ( 06 Oct 2018 11:47 )  WBC Count : 15.41 K/uL  Hemoglobin : 12.0 g/dL  Hematocrit : 42.5 %  Platelet Count - Automated : 116 K/uL  Mean Cell Volume : 90.6 fl  Mean Cell Hemoglobin : 25.6 pg  Mean Cell Hemoglobin Concentration : 28.2 gm/dL  Auto Neutrophil # : x  Auto Lymphocyte # : x  Auto Monocyte # : x  Auto Eosinophil # : x  Auto Basophil # : x  Auto Neutrophil % : x  Auto Lymphocyte % : x  Auto Monocyte % : x  Auto Eosinophil % : x  Auto Basophil % : x    .		Differential:	[] Automated		[] Manual  Chemistry                        12.0   15.41 )-----------( 116      ( 06 Oct 2018 11:47 )             42.5     10-06    143  |  105  |  36<H>  ----------------------------<  128<H>  4.6   |  12<L>  |  2.35<H>    Ca    6.5<LL>      06 Oct 2018 11:47  Phos  >9.0     10-06  Mg     2.6     10-06    TPro  5.9<L>  /  Alb  1.6<L>  /  TBili  2.9<H>  /  DBili  2.57<H>  /  AST  >08759  /  ALT  1285<H>  /  AlkPhos  235<H>  10-06    LIVER FUNCTIONS - ( 06 Oct 2018 11:47 )  Alb: 1.6 g/dL / Pro: 5.9 gm/dL / ALK PHOS: 235 U/L / ALT: 1285 U/L / AST: >52986 U/L / GGT: x           PT/INR - ( 06 Oct 2018 11:47 )   PT: 40.4 sec;   INR: 3.61 ratio         PTT - ( 06 Oct 2018 11:47 )  PTT:61.6 sec      ABG - ( 06 Oct 2018 09:44 )  pH, Arterial: x     pH, Blood: 7.01  /  pCO2: 41    /  pO2: 121   / HCO3: 10    / Base Excess: -20.8 /  SaO2: 96            Lactate, Blood: 17.0 mmol/L <HH> (10-06-18 @ 11:47)  Troponin I, Serum: .508 ng/mL <H> (10-06-18 @ 04:59)  Troponin I, Serum: .343 ng/mL <H> (10-05-18 @ 21:40)      Lactate, Blood: 17.0 mmol/L (10-06-18 @ 11:47)      MICROBIOLOGY/CULTURES:  Culture Results:   No growth to date. (10-04 @ 10:19)  Culture Results:   No growth to date. (10-04 @ 10:19)  Culture Results:   No growth (10-04 @ 09:37)      RADIOLOGY RESULTS:  < from: Xray Chest 1 View-PORTABLE IMMEDIATE (10.06.18 @ 04:56) >  INTERPRETATION:  PORTABLE CHEST    INDICATION: hypoxemia    TECHNIQUE: AP view.     COMPARISON: 10/6/2018 12:51 AM    FINDINGS/  IMPRESSION:    Lines and tubes in satisfactory position.    Increased bilateral airspace opacities may represent worsening infection,   ARDS or pulmonary edema. Small bilateral pleural effusions. No   pneumothorax.    The cardiomediastinal silhouette is normal.    < end of copied text >        ## Medications:  MEDICATIONS  (STANDING):  amiodarone Infusion 1 mG/Min (33.333 mL/Hr) IV Continuous <Continuous>  amiodarone Infusion 0.5 mG/Min (16.667 mL/Hr) IV Continuous <Continuous>  aspirin 325 milliGRAM(s) Oral daily  chlorhexidine 0.12% Liquid 15 milliLiter(s) Oral Mucosa two times a day  chlorhexidine 4% Liquid 1 Application(s) Topical <User Schedule>  dexmedetomidine Infusion 0.3 MICROgram(s)/kG/Hr (5.625 mL/Hr) IV Continuous <Continuous>  dextrose 5% 1000 milliLiter(s) (150 mL/Hr) IV Continuous <Continuous>  digoxin     Tablet 0.125 milliGRAM(s) Oral daily  EPINEPHrine    Infusion 0.1 MICROgram(s)/kG/Min (28.13 mL/Hr) IV Continuous <Continuous>  fentaNYL   Infusion. 0.5 MICROgram(s)/kG/Hr (3.75 mL/Hr) IV Continuous <Continuous>  furosemide   Injectable 40 milliGRAM(s) IV Push every 12 hours  heparin  Injectable 5000 Unit(s) SubCutaneous every 8 hours  hydrocortisone sodium succinate Injectable 80 milliGRAM(s) IV Push every 8 hours  midodrine 10 milliGRAM(s) Oral three times a day  norepinephrine Infusion 0.05 MICROgram(s)/kG/Min (3.516 mL/Hr) IV Continuous <Continuous>  pantoprazole  Injectable 40 milliGRAM(s) IV Push daily  phenylephrine    Infusion 0.1 MICROgram(s)/kG/Min (1.406 mL/Hr) IV Continuous <Continuous>  piperacillin/tazobactam IVPB. 3.375 Gram(s) IV Intermittent every 12 hours  propofol Infusion 30 MICROgram(s)/kG/Min (13.5 mL/Hr) IV Continuous <Continuous>  simvastatin 20 milliGRAM(s) Oral at bedtime  vasopressin Infusion 0.04 Unit(s)/Min (2.4 mL/Hr) IV Continuous <Continuous>    ## O/E:I&O's Summary    05 Oct 2018 07:01  -  06 Oct 2018 07:00  --------------------------------------------------------  IN: 6032.6 mL / OUT: 555 mL / NET: 5477.6 mL    06 Oct 2018 07:01  -  06 Oct 2018 15:45  --------------------------------------------------------  IN: 1011.2 mL / OUT: 40 mL / NET: 971.2 mL        ## Daily Issues  - Analgesia: N/A  - Sedation: propofol  - HOB elevation: Y  - GI ppx (ulcers): N/A  - DVT ppx: Hep SC  - Nutrition: PO diet  - Central line: N  - Thompson: N      ## Code status:  Goals of care discussion: [x] yes [ ] no  [x] full code  [ ] DNR  [ ] DNI  [ ] JACQUE CC: Patient is a 68y old  Male who presents with a chief complaint of resp failure (05 Oct 2018 17:03)      ## HPI:HPI:  Patient is a 68 year old male with pmhx of , CHF w/ AICD, Emphysema, former smoker(quit 10yrs ago), BPH ,  c/o difficulty breathing x3 days, found by EMS in distress with low saturation without oxygen, and placed on bipap machine and transferred to ED.  While in ED, pt is being worked up for sepsis, received 2L of normal saline boluses, and also received 40mg of lasix of CHF exacerbation.  Pt very uncomfortable looking, unable to provide history, while on bipap machine.    Patient transferred to critical care for further care. (04 Oct 2018 05:29)      O/N: central line placed emergently for increasing pressor requirements despite 3 pressors, patient severly hypotensive with severe shock liver and AST in 39458's    ## ROS:  unable to assess ros due to mental status   ## EXAM  ICU Vital Signs Last 24 Hrs  T(C): 36.4 (06 Oct 2018 15:30), Max: 38 (05 Oct 2018 19:27)  T(F): 97.5 (06 Oct 2018 15:30), Max: 100.4 (05 Oct 2018 19:27)  HR: 63 (06 Oct 2018 15:00) (59 - 125)  BP: 46/31 (06 Oct 2018 15:00) (36/16 - 126/93)  BP(mean): 35 (06 Oct 2018 15:00) (4 - 100)  ABP: --  ABP(mean): --  RR: 20 (06 Oct 2018 15:00) (16 - 39)  SpO2: 100% (06 Oct 2018 15:00) (88% - 100%)    CON : NAD  EENT : EOMI, MMM  NECK : Full ROM  RESP : CTAB no increased WOB  CARD : rrr no m/r/g  ABD : S NT ND NABS no rebound  EXT : cool extremities  NEURO : AAOX0  ## Labs:  Lab Results:  CBC  CBC Full  -  ( 06 Oct 2018 11:47 )  WBC Count : 15.41 K/uL  Hemoglobin : 12.0 g/dL  Hematocrit : 42.5 %  Platelet Count - Automated : 116 K/uL  Mean Cell Volume : 90.6 fl  Mean Cell Hemoglobin : 25.6 pg  Mean Cell Hemoglobin Concentration : 28.2 gm/dL  Auto Neutrophil # : x  Auto Lymphocyte # : x  Auto Monocyte # : x  Auto Eosinophil # : x  Auto Basophil # : x  Auto Neutrophil % : x  Auto Lymphocyte % : x  Auto Monocyte % : x  Auto Eosinophil % : x  Auto Basophil % : x    .		Differential:	[] Automated		[] Manual  Chemistry                        12.0   15.41 )-----------( 116      ( 06 Oct 2018 11:47 )             42.5     10-06    143  |  105  |  36<H>  ----------------------------<  128<H>  4.6   |  12<L>  |  2.35<H>    Ca    6.5<LL>      06 Oct 2018 11:47  Phos  >9.0     10-06  Mg     2.6     10-06    TPro  5.9<L>  /  Alb  1.6<L>  /  TBili  2.9<H>  /  DBili  2.57<H>  /  AST  >98914  /  ALT  1285<H>  /  AlkPhos  235<H>  10-06    LIVER FUNCTIONS - ( 06 Oct 2018 11:47 )  Alb: 1.6 g/dL / Pro: 5.9 gm/dL / ALK PHOS: 235 U/L / ALT: 1285 U/L / AST: >20503 U/L / GGT: x           PT/INR - ( 06 Oct 2018 11:47 )   PT: 40.4 sec;   INR: 3.61 ratio         PTT - ( 06 Oct 2018 11:47 )  PTT:61.6 sec      ABG - ( 06 Oct 2018 09:44 )  pH, Arterial: x     pH, Blood: 7.01  /  pCO2: 41    /  pO2: 121   / HCO3: 10    / Base Excess: -20.8 /  SaO2: 96            Lactate, Blood: 17.0 mmol/L <HH> (10-06-18 @ 11:47)  Troponin I, Serum: .508 ng/mL <H> (10-06-18 @ 04:59)  Troponin I, Serum: .343 ng/mL <H> (10-05-18 @ 21:40)      Lactate, Blood: 17.0 mmol/L (10-06-18 @ 11:47)      MICROBIOLOGY/CULTURES:  Culture Results:   No growth to date. (10-04 @ 10:19)  Culture Results:   No growth to date. (10-04 @ 10:19)  Culture Results:   No growth (10-04 @ 09:37)      RADIOLOGY RESULTS:  < from: Xray Chest 1 View-PORTABLE IMMEDIATE (10.06.18 @ 04:56) >  INTERPRETATION:  PORTABLE CHEST    INDICATION: hypoxemia    TECHNIQUE: AP view.     COMPARISON: 10/6/2018 12:51 AM    FINDINGS/  IMPRESSION:    Lines and tubes in satisfactory position.    Increased bilateral airspace opacities may represent worsening infection,   ARDS or pulmonary edema. Small bilateral pleural effusions. No   pneumothorax.    The cardiomediastinal silhouette is normal.    < end of copied text >        ## Medications:  MEDICATIONS  (STANDING):  amiodarone Infusion 1 mG/Min (33.333 mL/Hr) IV Continuous <Continuous>  amiodarone Infusion 0.5 mG/Min (16.667 mL/Hr) IV Continuous <Continuous>  aspirin 325 milliGRAM(s) Oral daily  chlorhexidine 0.12% Liquid 15 milliLiter(s) Oral Mucosa two times a day  chlorhexidine 4% Liquid 1 Application(s) Topical <User Schedule>  dexmedetomidine Infusion 0.3 MICROgram(s)/kG/Hr (5.625 mL/Hr) IV Continuous <Continuous>  dextrose 5% 1000 milliLiter(s) (150 mL/Hr) IV Continuous <Continuous>  digoxin     Tablet 0.125 milliGRAM(s) Oral daily  EPINEPHrine    Infusion 0.1 MICROgram(s)/kG/Min (28.13 mL/Hr) IV Continuous <Continuous>  fentaNYL   Infusion. 0.5 MICROgram(s)/kG/Hr (3.75 mL/Hr) IV Continuous <Continuous>  furosemide   Injectable 40 milliGRAM(s) IV Push every 12 hours  heparin  Injectable 5000 Unit(s) SubCutaneous every 8 hours  hydrocortisone sodium succinate Injectable 80 milliGRAM(s) IV Push every 8 hours  midodrine 10 milliGRAM(s) Oral three times a day  norepinephrine Infusion 0.05 MICROgram(s)/kG/Min (3.516 mL/Hr) IV Continuous <Continuous>  pantoprazole  Injectable 40 milliGRAM(s) IV Push daily  phenylephrine    Infusion 0.1 MICROgram(s)/kG/Min (1.406 mL/Hr) IV Continuous <Continuous>  piperacillin/tazobactam IVPB. 3.375 Gram(s) IV Intermittent every 12 hours  propofol Infusion 30 MICROgram(s)/kG/Min (13.5 mL/Hr) IV Continuous <Continuous>  simvastatin 20 milliGRAM(s) Oral at bedtime  vasopressin Infusion 0.04 Unit(s)/Min (2.4 mL/Hr) IV Continuous <Continuous>    ## O/E:I&O's Summary    05 Oct 2018 07:01  -  06 Oct 2018 07:00  --------------------------------------------------------  IN: 6032.6 mL / OUT: 555 mL / NET: 5477.6 mL    06 Oct 2018 07:01  -  06 Oct 2018 15:45  --------------------------------------------------------  IN: 1011.2 mL / OUT: 40 mL / NET: 971.2 mL        ## Daily Issues  - Analgesia:  fentanyl  - Sedation:   - HOB elevation: Y  - GI ppx (ulcers): N/A  - DVT ppx: Hep SC  - Nutrition: PO diet  - Central line: N  - Thompson: N      ## Code status:  Goals of care discussion: [x] yes [ ] no  [x] full code  [ ] DNR  [ ] DNI  [ ] JACUQE

## 2018-10-06 NOTE — DIETITIAN INITIAL EVALUATION ADULT. - ENERGY NEEDS
Height (cm): 167.64 (10-06)  Weight (kg): 75 (10-04)  BMI (kg/m2): 26.7 (10-06)  IBW: 64.4 kg          % IBW:  116%          UBW: ?          %UBW

## 2018-10-06 NOTE — PHARMACY COMMUNICATION NOTE - COMMENTS
verified with ROSANA dawson, she wants two more 100mg bicarbonate pushes, as pt ph was 6.9
spoke with md about lidocaine push- already was given to patient

## 2018-10-06 NOTE — DIETITIAN INITIAL EVALUATION ADULT. - PHYSICAL APPEARANCE
BMI=26.7(10-04), pt is on hypothermia blanket c poor medical condition, nutrition focused physical exam not applicable/debilitated/other (specify)

## 2018-10-06 NOTE — PROGRESS NOTE ADULT - ASSESSMENT
67 y/o M w/hx of HFrEF s/p AICD placement admitted for acute hypoxemic respiratory failure likely secondary to pulmonary edema in the setting of heart failure exacerbation. CXR showing bilateral infiltrates consistent with acute pulmonary edema. BNP > 44245. Now leukocytosis, however patient does have documented fever. Patient also w/DAVID vs CKD. Prior Cr in August 1.40. US of lower extremities negative for DVT. Episodes of Vtach today accompanied by hemodynamic instability. AICD did not fire. Now s/p amio and lidocaine boluses on amio gtt with control of heart rate and improvement in BP. Remains on pressors for hypotension, likely cardiac related. Now with worsening hypoxemia, intubated for acute hypoxemic respiratory failure.     Neuro: Sedation as needed goal RASS -1 to 0  - Fentanyl, propofol    Resp:  - TV: 6 cc/Kg IBW  - Daily SAT/SBT    CV:  - Diuresis as tolerated  - Pressor support goal MAP >= 65  - Amio gtt for VTach    Renal: PATI    ID:   - Empiric abx, no clear source of infection    FEN:  - Tube feeds  - Replete lytes PRN    PPx:  - DVT/GI prophylaxis    Attending critical care time 50 minutes
68 year old male with severe cardiomyopathy admitted to ICU for fluid overload/pulm edema now with severe shock concern for possible pe vs cardiac arrythmia vs septic shock vs ards       NEURO: intubated and sedated. pupils fixed. fentanyl for sedation  RESP: had mixed acidosis, increased vent setting to compensate for resp component of  acidosis. diffuse infiltrates, possible ards.    CV: severe shock unclear etiology cardiogenic vs septic. on 4 pressors currently. will consider dobutamine, but concerned that patient too hypotensive to tolerate. amio for afib, will hold amio. asa  FEN/GI: Shock liver, ast greater than 10,000.  NPO. INR rising, likely 2/2 liver failure  RENAL: monitor uop and cr.  Maintain UOP at 0.5 mL/kg/hr. cr worsening, likely 2/2 shock state  ID: monique [email or web address] for sepsis with unclear source as cultures have been negative  ENDO:ssi  PPX:scd sq  ETHICS: full code. discussed poor prognosis with brother Ba  DISPO: continue care in ICU, patient with extremely poor prognosis    I have decided to review and order of clinical lab tests  Relevant radiology studies were reviewed  Decision made to obtain available old records  Discussion of test results with performing physician  Review and summarization of old records obtaining history from EMR   discussion of case with another health care provider ICU PA, Dr. Ross  I have visualized independent imaging, EKGs and radiologies studies available but not otherwise officially read  Patient is critically ill and could decompensate imminently.  The patient required immediate attention  The medically necessary treatment decisions were required due to possibility of imminent threat to the patient  the patient is unable  to participate in giving history and/or making treatment decisions;     ccm 45 min

## 2018-10-06 NOTE — DIETITIAN INITIAL EVALUATION ADULT. - PERTINENT MEDS FT
amiodarone , epinephrine infusion, norepinephrine infusion , dexmedetomidine infusion , fentanyl infusion, propofol(10-05, 120 if=308 calories) vasopressin infusion, digoxin, furosemide, midodrine, dextrose 5% @ 150 ml/hr

## 2018-10-06 NOTE — PROVIDER CONTACT NOTE (MEDICATION) - ASSESSMENT
Pt hypotensive with SBP persistently dropping to 60's, intubated and well sedated, breathing 24 on vent.  Pt likely in cardiogenic shock state, Armen not the best pressor in the setting of poor LV dysfunction.  Would start vasopressin at this time and consider additional inotropic support if needed

## 2018-10-06 NOTE — PROCEDURE NOTE - NSPROCDETAILS_GEN_ALL_CORE
patient pre-oxygenated, tube inserted, placement confirmed
sterile dressing applied/sterile technique, catheter placed/ultrasound guidance/guidewire recovered/lumen(s) aspirated and flushed

## 2018-10-06 NOTE — GOALS OF CARE CONVERSATION - PERSONAL ADVANCE DIRECTIVE - CONVERSATION DETAILS
Discussed poor prognosis and patient presently with full life support measures. Don states his brother had no wishes and to call sister Julia [phone]    No answer at above number. Patient actively declining

## 2018-10-06 NOTE — PROVIDER CONTACT NOTE (MEDICATION) - SITUATION
69 y/o M w/hx of HFrEF s/p AICD placement admitted for acute hypoxemic respiratory failure likely secondary to pulmonary edema in the setting of heart failure exacerbation, in cardiogenic shock

## 2018-10-07 NOTE — DISCHARGE NOTE FOR THE EXPIRED PATIENT - HOSPITAL COURSE
Patient is a 68 year old male with pmhx of , CHF w/ AICD, Emphysema, former smoker(quit 10yrs ago), BPH ,  c/o difficulty breathing x3 days, found by EMS in distress with low saturation without oxygen, and placed on bipap machine and transferred to ED.  While in ED, pt is being worked up for sepsis, received 2L of normal saline boluses, and also received 40mg of lasix of CHF exacerbation.  Pt very uncomfortable looking, unable to provide history, while on bipap machine.  While in ICU,  Patient noted to be in ventricular tachycardia, patient initially mainted normal BP despite VTach to 150s/160s. Patient was given amio 150 mg bolus and lidocaine bolus. VTach soon broke and BP returned to baseline. After the event patient became altered and was intubated for airway protection and acute hypoxemic respiratory failure. Amio gtt was initiated.  Patient then went into shock, likely cardiogenic shock and started on mulitple vasopressors: levophed, phenylephrine, vasopressin, dobutamine and epinephrine drip all maxed dose. Family: both brother and sister were reached and know patient prognosis.  Today, patient noted to have no improvement in blood pressure and patient nicole into asystole. ACLS started immediately, and received multiple dosages of epinephrine. Patient then declared death at 10/7/18 @09:34 am.  Both sister and brother were attempted to reach through phone found in patient cell phone contacts, sister Kathryn at [phone] called back and was able to tell sister.

## 2018-10-08 LAB
CULTURE RESULTS: NO GROWTH — SIGNIFICANT CHANGE UP
GRAM STN FLD: SIGNIFICANT CHANGE UP
SPECIMEN SOURCE: SIGNIFICANT CHANGE UP

## 2018-10-09 LAB
CULTURE RESULTS: SIGNIFICANT CHANGE UP
CULTURE RESULTS: SIGNIFICANT CHANGE UP
SPECIMEN SOURCE: SIGNIFICANT CHANGE UP
SPECIMEN SOURCE: SIGNIFICANT CHANGE UP

## 2018-10-11 LAB
CULTURE RESULTS: SIGNIFICANT CHANGE UP
SPECIMEN SOURCE: SIGNIFICANT CHANGE UP

## 2018-10-18 DIAGNOSIS — I42.9 CARDIOMYOPATHY, UNSPECIFIED: ICD-10-CM

## 2018-10-18 DIAGNOSIS — I50.810 RIGHT HEART FAILURE, UNSPECIFIED: ICD-10-CM

## 2018-10-18 DIAGNOSIS — J96.01 ACUTE RESPIRATORY FAILURE WITH HYPOXIA: ICD-10-CM

## 2018-10-18 DIAGNOSIS — Z79.82 LONG TERM (CURRENT) USE OF ASPIRIN: ICD-10-CM

## 2018-10-18 DIAGNOSIS — Z95.810 PRESENCE OF AUTOMATIC (IMPLANTABLE) CARDIAC DEFIBRILLATOR: ICD-10-CM

## 2018-10-18 DIAGNOSIS — I47.2 VENTRICULAR TACHYCARDIA: ICD-10-CM

## 2018-10-18 DIAGNOSIS — E87.4 MIXED DISORDER OF ACID-BASE BALANCE: ICD-10-CM

## 2018-10-18 DIAGNOSIS — I25.10 ATHEROSCLEROTIC HEART DISEASE OF NATIVE CORONARY ARTERY WITHOUT ANGINA PECTORIS: ICD-10-CM

## 2018-10-18 DIAGNOSIS — R57.0 CARDIOGENIC SHOCK: ICD-10-CM

## 2018-10-18 DIAGNOSIS — N40.0 BENIGN PROSTATIC HYPERPLASIA WITHOUT LOWER URINARY TRACT SYMPTOMS: ICD-10-CM

## 2018-10-18 DIAGNOSIS — N17.9 ACUTE KIDNEY FAILURE, UNSPECIFIED: ICD-10-CM

## 2018-10-18 DIAGNOSIS — I10 ESSENTIAL (PRIMARY) HYPERTENSION: ICD-10-CM

## 2018-10-18 DIAGNOSIS — K72.00 ACUTE AND SUBACUTE HEPATIC FAILURE WITHOUT COMA: ICD-10-CM

## 2018-10-18 DIAGNOSIS — J43.9 EMPHYSEMA, UNSPECIFIED: ICD-10-CM

## 2018-10-18 DIAGNOSIS — Z87.891 PERSONAL HISTORY OF NICOTINE DEPENDENCE: ICD-10-CM

## 2020-11-17 NOTE — PATIENT PROFILE ADULT. - NS TRANSFER DISPOSITION PATIENT BELONGINGS
[Dear  ___] : Dear  [unfilled], [Consult Letter:] : I had the pleasure of evaluating your patient, [unfilled]. [Please see my note below.] : Please see my note below. [Consult Closing:] : Thank you very much for allowing me to participate in the care of this patient.  If you have any questions, please do not hesitate to contact me. [Sincerely,] : Sincerely, [FreeTextEntry2] : CHERELLE LANG (PCP)\par  [FreeTextEntry3] : Gerhard Hurd MD\par , [facility]\par General Hepatology and Gastroenterology\par [facility]\par [facility]\par [address] [place] [address]\[place] [address]\par [phone]\par  with patient